# Patient Record
Sex: MALE | Race: BLACK OR AFRICAN AMERICAN | Employment: FULL TIME | URBAN - METROPOLITAN AREA
[De-identification: names, ages, dates, MRNs, and addresses within clinical notes are randomized per-mention and may not be internally consistent; named-entity substitution may affect disease eponyms.]

---

## 2022-10-28 ENCOUNTER — APPOINTMENT (OUTPATIENT)
Dept: GENERAL RADIOLOGY | Age: 52
DRG: 482 | End: 2022-10-28
Attending: EMERGENCY MEDICINE
Payer: COMMERCIAL

## 2022-10-28 ENCOUNTER — HOSPITAL ENCOUNTER (INPATIENT)
Age: 52
LOS: 3 days | Discharge: HOME HEALTH | DRG: 482 | End: 2022-10-31
Attending: EMERGENCY MEDICINE | Admitting: ORTHOPAEDIC SURGERY
Payer: COMMERCIAL

## 2022-10-28 DIAGNOSIS — S72.001A CLOSED FRACTURE OF RIGHT HIP, INITIAL ENCOUNTER (HCC): Primary | ICD-10-CM

## 2022-10-28 PROCEDURE — 86900 BLOOD TYPING SEROLOGIC ABO: CPT

## 2022-10-28 PROCEDURE — 74011250636 HC RX REV CODE- 250/636: Performed by: EMERGENCY MEDICINE

## 2022-10-28 PROCEDURE — 85025 COMPLETE CBC W/AUTO DIFF WBC: CPT

## 2022-10-28 PROCEDURE — 80076 HEPATIC FUNCTION PANEL: CPT

## 2022-10-28 PROCEDURE — 85610 PROTHROMBIN TIME: CPT

## 2022-10-28 PROCEDURE — 85730 THROMBOPLASTIN TIME PARTIAL: CPT

## 2022-10-28 PROCEDURE — 65270000029 HC RM PRIVATE

## 2022-10-28 PROCEDURE — 73552 X-RAY EXAM OF FEMUR 2/>: CPT

## 2022-10-28 PROCEDURE — 99285 EMERGENCY DEPT VISIT HI MDM: CPT

## 2022-10-28 PROCEDURE — 80048 BASIC METABOLIC PNL TOTAL CA: CPT

## 2022-10-28 PROCEDURE — 73502 X-RAY EXAM HIP UNI 2-3 VIEWS: CPT

## 2022-10-28 RX ORDER — MORPHINE SULFATE 4 MG/ML
4 INJECTION INTRAVENOUS ONCE
Status: COMPLETED | OUTPATIENT
Start: 2022-10-28 | End: 2022-10-28

## 2022-10-28 RX ORDER — SODIUM CHLORIDE, SODIUM LACTATE, POTASSIUM CHLORIDE, CALCIUM CHLORIDE 600; 310; 30; 20 MG/100ML; MG/100ML; MG/100ML; MG/100ML
75 INJECTION, SOLUTION INTRAVENOUS CONTINUOUS
Status: DISCONTINUED | OUTPATIENT
Start: 2022-10-28 | End: 2022-10-31 | Stop reason: HOSPADM

## 2022-10-28 RX ORDER — ACETAMINOPHEN 325 MG/1
650 TABLET ORAL EVERY 6 HOURS
Status: DISCONTINUED | OUTPATIENT
Start: 2022-10-29 | End: 2022-10-31

## 2022-10-28 RX ORDER — OXYCODONE HYDROCHLORIDE 5 MG/1
5 TABLET ORAL
Status: DISCONTINUED | OUTPATIENT
Start: 2022-10-28 | End: 2022-10-31

## 2022-10-28 RX ORDER — MORPHINE SULFATE 2 MG/ML
1 INJECTION, SOLUTION INTRAMUSCULAR; INTRAVENOUS
Status: DISCONTINUED | OUTPATIENT
Start: 2022-10-28 | End: 2022-10-29

## 2022-10-28 RX ORDER — ONDANSETRON 2 MG/ML
4 INJECTION INTRAMUSCULAR; INTRAVENOUS
Status: COMPLETED | OUTPATIENT
Start: 2022-10-28 | End: 2022-10-28

## 2022-10-28 RX ORDER — SODIUM CHLORIDE 9 MG/ML
150 INJECTION, SOLUTION INTRAVENOUS ONCE
Status: COMPLETED | OUTPATIENT
Start: 2022-10-28 | End: 2022-10-29

## 2022-10-28 RX ADMIN — ONDANSETRON 4 MG: 2 INJECTION INTRAMUSCULAR; INTRAVENOUS at 22:52

## 2022-10-28 RX ADMIN — SODIUM CHLORIDE 150 ML/HR: 9 INJECTION, SOLUTION INTRAVENOUS at 22:54

## 2022-10-28 RX ADMIN — MORPHINE SULFATE 4 MG: 4 INJECTION INTRAVENOUS at 22:52

## 2022-10-28 NOTE — Clinical Note
Status[de-identified] INPATIENT [101]   Type of Bed: Surgical [18]   Inpatient Hospitalization Certified Necessary for the Following Reasons: 3.  Patient receiving treatment that can only be provided in an inpatient setting (further clarification in H&P documentation)   Admitting Diagnosis: Closed right hip fracture [facility]) [[id number]]   Admitting Physician: Farhana Blood   Attending Physician: Farhana Blood   Estimated Length of Stay: 2 Midnights   Discharge Plan[de-identified] Home with Office Follow-up

## 2022-10-29 ENCOUNTER — ANESTHESIA (OUTPATIENT)
Dept: SURGERY | Age: 52
DRG: 482 | End: 2022-10-29
Payer: COMMERCIAL

## 2022-10-29 ENCOUNTER — ANESTHESIA EVENT (OUTPATIENT)
Dept: SURGERY | Age: 52
DRG: 482 | End: 2022-10-29
Payer: COMMERCIAL

## 2022-10-29 ENCOUNTER — APPOINTMENT (OUTPATIENT)
Dept: GENERAL RADIOLOGY | Age: 52
DRG: 482 | End: 2022-10-29
Attending: ORTHOPAEDIC SURGERY
Payer: COMMERCIAL

## 2022-10-29 LAB
ABO + RH BLD: NORMAL
ALBUMIN SERPL-MCNC: 3.7 G/DL (ref 3.5–5)
ALBUMIN/GLOB SERPL: 1 {RATIO} (ref 1.1–2.2)
ALP SERPL-CCNC: 71 U/L (ref 45–117)
ALT SERPL-CCNC: 20 U/L (ref 12–78)
ANION GAP SERPL CALC-SCNC: 9 MMOL/L (ref 5–15)
APTT PPP: 22.7 SEC (ref 22.1–31)
AST SERPL W P-5'-P-CCNC: 24 U/L (ref 15–37)
BASOPHILS # BLD: 0 K/UL (ref 0–0.1)
BASOPHILS NFR BLD: 0 % (ref 0–1)
BILIRUB DIRECT SERPL-MCNC: 0.2 MG/DL (ref 0–0.2)
BILIRUB SERPL-MCNC: 0.8 MG/DL (ref 0.2–1)
BLOOD GROUP ANTIBODIES SERPL: NEGATIVE
BUN SERPL-MCNC: 17 MG/DL (ref 6–20)
BUN/CREAT SERPL: 15 (ref 12–20)
CA-I BLD-MCNC: 9 MG/DL (ref 8.5–10.1)
CHLORIDE SERPL-SCNC: 101 MMOL/L (ref 97–108)
CO2 SERPL-SCNC: 29 MMOL/L (ref 21–32)
CREAT SERPL-MCNC: 1.17 MG/DL (ref 0.7–1.3)
DIFFERENTIAL METHOD BLD: ABNORMAL
EOSINOPHIL # BLD: 0 K/UL (ref 0–0.4)
EOSINOPHIL NFR BLD: 0 % (ref 0–7)
ERYTHROCYTE [DISTWIDTH] IN BLOOD BY AUTOMATED COUNT: 13.3 % (ref 11.5–14.5)
GLOBULIN SER CALC-MCNC: 3.8 G/DL (ref 2–4)
GLUCOSE SERPL-MCNC: 130 MG/DL (ref 65–100)
HCT VFR BLD AUTO: 47.1 % (ref 36.6–50.3)
HGB BLD-MCNC: 15 G/DL (ref 12.1–17)
IMM GRANULOCYTES # BLD AUTO: 0 K/UL (ref 0–0.04)
IMM GRANULOCYTES NFR BLD AUTO: 0 % (ref 0–0.5)
INR PPP: 1 (ref 0.9–1.1)
LYMPHOCYTES # BLD: 1.2 K/UL (ref 0.8–3.5)
LYMPHOCYTES NFR BLD: 13 % (ref 12–49)
MCH RBC QN AUTO: 28.5 PG (ref 26–34)
MCHC RBC AUTO-ENTMCNC: 31.8 G/DL (ref 30–36.5)
MCV RBC AUTO: 89.4 FL (ref 80–99)
MONOCYTES # BLD: 0.5 K/UL (ref 0–1)
MONOCYTES NFR BLD: 6 % (ref 5–13)
NEUTS SEG # BLD: 7.8 K/UL (ref 1.8–8)
NEUTS SEG NFR BLD: 81 % (ref 32–75)
PLATELET # BLD AUTO: 223 K/UL (ref 150–400)
PMV BLD AUTO: 10.6 FL (ref 8.9–12.9)
POTASSIUM SERPL-SCNC: 3.8 MMOL/L (ref 3.5–5.1)
PROT SERPL-MCNC: 7.5 G/DL (ref 6.4–8.2)
PROTHROMBIN TIME: 9.8 SEC (ref 9–11.1)
RBC # BLD AUTO: 5.27 M/UL (ref 4.1–5.7)
SODIUM SERPL-SCNC: 139 MMOL/L (ref 136–145)
SPECIMEN EXP DATE BLD: NORMAL
THERAPEUTIC RANGE,PTTT: NORMAL SEC (ref 82–109)
WBC # BLD AUTO: 9.6 K/UL (ref 4.1–11.1)

## 2022-10-29 PROCEDURE — 74011250637 HC RX REV CODE- 250/637: Performed by: ORTHOPAEDIC SURGERY

## 2022-10-29 PROCEDURE — 74011250636 HC RX REV CODE- 250/636: Performed by: ORTHOPAEDIC SURGERY

## 2022-10-29 PROCEDURE — 76010000153 HC OR TIME 1.5 TO 2 HR: Performed by: ORTHOPAEDIC SURGERY

## 2022-10-29 PROCEDURE — C1713 ANCHOR/SCREW BN/BN,TIS/BN: HCPCS | Performed by: ORTHOPAEDIC SURGERY

## 2022-10-29 PROCEDURE — 74011000250 HC RX REV CODE- 250: Performed by: ORTHOPAEDIC SURGERY

## 2022-10-29 PROCEDURE — 65270000029 HC RM PRIVATE

## 2022-10-29 PROCEDURE — 0QS636Z REPOSITION RIGHT UPPER FEMUR WITH INTRAMEDULLARY INTERNAL FIXATION DEVICE, PERCUTANEOUS APPROACH: ICD-10-PCS | Performed by: ORTHOPAEDIC SURGERY

## 2022-10-29 PROCEDURE — 74011250636 HC RX REV CODE- 250/636: Performed by: NURSE ANESTHETIST, CERTIFIED REGISTERED

## 2022-10-29 PROCEDURE — 77030002933 HC SUT MCRYL J&J -A: Performed by: ORTHOPAEDIC SURGERY

## 2022-10-29 PROCEDURE — 77030041614 HC WRP CLD THER BREG -B: Performed by: ORTHOPAEDIC SURGERY

## 2022-10-29 PROCEDURE — 2709999900 HC NON-CHARGEABLE SUPPLY: Performed by: ORTHOPAEDIC SURGERY

## 2022-10-29 PROCEDURE — 76210000016 HC OR PH I REC 1 TO 1.5 HR: Performed by: ORTHOPAEDIC SURGERY

## 2022-10-29 PROCEDURE — 74011250636 HC RX REV CODE- 250/636: Performed by: EMERGENCY MEDICINE

## 2022-10-29 PROCEDURE — 74011000250 HC RX REV CODE- 250: Performed by: NURSE ANESTHETIST, CERTIFIED REGISTERED

## 2022-10-29 PROCEDURE — 74011000258 HC RX REV CODE- 258: Performed by: NURSE ANESTHETIST, CERTIFIED REGISTERED

## 2022-10-29 PROCEDURE — C1769 GUIDE WIRE: HCPCS | Performed by: ORTHOPAEDIC SURGERY

## 2022-10-29 PROCEDURE — 77030016474 HC BIT DRL QC3 SYNT -C: Performed by: ORTHOPAEDIC SURGERY

## 2022-10-29 PROCEDURE — 77030018673: Performed by: ORTHOPAEDIC SURGERY

## 2022-10-29 PROCEDURE — 77030040361 HC SLV COMPR DVT MDII -B: Performed by: ORTHOPAEDIC SURGERY

## 2022-10-29 PROCEDURE — 74011250636 HC RX REV CODE- 250/636: Performed by: ANESTHESIOLOGY

## 2022-10-29 PROCEDURE — 76060000034 HC ANESTHESIA 1.5 TO 2 HR: Performed by: ORTHOPAEDIC SURGERY

## 2022-10-29 PROCEDURE — 76000 FLUOROSCOPY <1 HR PHYS/QHP: CPT

## 2022-10-29 PROCEDURE — 77030031139 HC SUT VCRL2 J&J -A: Performed by: ORTHOPAEDIC SURGERY

## 2022-10-29 DEVICE — NAIL IM L235MM DIA11MM 130DEG SHT GRN R PROX FEM TI: Type: IMPLANTABLE DEVICE | Site: HIP | Status: FUNCTIONAL

## 2022-10-29 DEVICE — IMPLANTABLE DEVICE: Type: IMPLANTABLE DEVICE | Site: HIP | Status: FUNCTIONAL

## 2022-10-29 DEVICE — SCREW BNE L44MM DIA5MM COR DIA4.3MM TIB LT GRN TI ALLY ST: Type: IMPLANTABLE DEVICE | Site: HIP | Status: FUNCTIONAL

## 2022-10-29 RX ORDER — HYDRALAZINE HYDROCHLORIDE 20 MG/ML
10 INJECTION INTRAMUSCULAR; INTRAVENOUS
Status: DISCONTINUED | OUTPATIENT
Start: 2022-10-29 | End: 2022-10-29 | Stop reason: HOSPADM

## 2022-10-29 RX ORDER — SODIUM CHLORIDE 0.9 % (FLUSH) 0.9 %
5-40 SYRINGE (ML) INJECTION AS NEEDED
Status: DISCONTINUED | OUTPATIENT
Start: 2022-10-29 | End: 2022-10-29 | Stop reason: HOSPADM

## 2022-10-29 RX ORDER — ONDANSETRON 2 MG/ML
4 INJECTION INTRAMUSCULAR; INTRAVENOUS AS NEEDED
Status: DISCONTINUED | OUTPATIENT
Start: 2022-10-29 | End: 2022-10-29 | Stop reason: HOSPADM

## 2022-10-29 RX ORDER — FENTANYL CITRATE 50 UG/ML
INJECTION, SOLUTION INTRAMUSCULAR; INTRAVENOUS AS NEEDED
Status: DISCONTINUED | OUTPATIENT
Start: 2022-10-29 | End: 2022-10-29 | Stop reason: HOSPADM

## 2022-10-29 RX ORDER — HYDROMORPHONE HYDROCHLORIDE 1 MG/ML
1 INJECTION, SOLUTION INTRAMUSCULAR; INTRAVENOUS; SUBCUTANEOUS ONCE
Status: COMPLETED | OUTPATIENT
Start: 2022-10-29 | End: 2022-10-29

## 2022-10-29 RX ORDER — DIPHENHYDRAMINE HCL 12.5MG/5ML
12.5 ELIXIR ORAL
Status: DISPENSED | OUTPATIENT
Start: 2022-10-29 | End: 2022-10-30

## 2022-10-29 RX ORDER — LABETALOL HCL 20 MG/4 ML
5 SYRINGE (ML) INTRAVENOUS
Status: DISCONTINUED | OUTPATIENT
Start: 2022-10-29 | End: 2022-10-29 | Stop reason: HOSPADM

## 2022-10-29 RX ORDER — MORPHINE SULFATE 10 MG/ML
2 INJECTION, SOLUTION INTRAMUSCULAR; INTRAVENOUS
Status: DISCONTINUED | OUTPATIENT
Start: 2022-10-29 | End: 2022-10-29 | Stop reason: HOSPADM

## 2022-10-29 RX ORDER — METOPROLOL TARTRATE 5 MG/5ML
2.5 INJECTION INTRAVENOUS
Status: DISCONTINUED | OUTPATIENT
Start: 2022-10-29 | End: 2022-10-29 | Stop reason: HOSPADM

## 2022-10-29 RX ORDER — NORETHINDRONE AND ETHINYL ESTRADIOL 0.5-0.035
5 KIT ORAL AS NEEDED
Status: DISCONTINUED | OUTPATIENT
Start: 2022-10-29 | End: 2022-10-29 | Stop reason: HOSPADM

## 2022-10-29 RX ORDER — MORPHINE SULFATE 15 MG/1
15 TABLET ORAL
Status: DISCONTINUED | OUTPATIENT
Start: 2022-10-29 | End: 2022-10-29 | Stop reason: HOSPADM

## 2022-10-29 RX ORDER — SODIUM CHLORIDE 0.9 % (FLUSH) 0.9 %
5-40 SYRINGE (ML) INJECTION EVERY 8 HOURS
Status: DISCONTINUED | OUTPATIENT
Start: 2022-10-29 | End: 2022-10-29 | Stop reason: HOSPADM

## 2022-10-29 RX ORDER — ASPIRIN 81 MG/1
81 TABLET ORAL 2 TIMES DAILY
Status: DISCONTINUED | OUTPATIENT
Start: 2022-10-29 | End: 2022-10-31 | Stop reason: HOSPADM

## 2022-10-29 RX ORDER — SODIUM CHLORIDE, SODIUM LACTATE, POTASSIUM CHLORIDE, CALCIUM CHLORIDE 600; 310; 30; 20 MG/100ML; MG/100ML; MG/100ML; MG/100ML
INJECTION, SOLUTION INTRAVENOUS
Status: DISCONTINUED | OUTPATIENT
Start: 2022-10-29 | End: 2022-10-29 | Stop reason: HOSPADM

## 2022-10-29 RX ORDER — MORPHINE SULFATE 2 MG/ML
1 INJECTION, SOLUTION INTRAMUSCULAR; INTRAVENOUS
Status: DISCONTINUED | OUTPATIENT
Start: 2022-10-29 | End: 2022-10-29

## 2022-10-29 RX ORDER — NORETHINDRONE AND ETHINYL ESTRADIOL 0.5-0.035
KIT ORAL AS NEEDED
Status: DISCONTINUED | OUTPATIENT
Start: 2022-10-29 | End: 2022-10-29 | Stop reason: HOSPADM

## 2022-10-29 RX ORDER — MIDAZOLAM HYDROCHLORIDE 1 MG/ML
0.5 INJECTION, SOLUTION INTRAMUSCULAR; INTRAVENOUS
Status: DISCONTINUED | OUTPATIENT
Start: 2022-10-29 | End: 2022-10-29 | Stop reason: HOSPADM

## 2022-10-29 RX ORDER — MIDAZOLAM HYDROCHLORIDE 1 MG/ML
1 INJECTION, SOLUTION INTRAMUSCULAR; INTRAVENOUS AS NEEDED
Status: DISCONTINUED | OUTPATIENT
Start: 2022-10-29 | End: 2022-10-29 | Stop reason: HOSPADM

## 2022-10-29 RX ORDER — SODIUM CHLORIDE 9 MG/ML
125 INJECTION, SOLUTION INTRAVENOUS CONTINUOUS
Status: DISPENSED | OUTPATIENT
Start: 2022-10-29 | End: 2022-10-30

## 2022-10-29 RX ORDER — OXYCODONE AND ACETAMINOPHEN 5; 325 MG/1; MG/1
1 TABLET ORAL AS NEEDED
Status: DISCONTINUED | OUTPATIENT
Start: 2022-10-29 | End: 2022-10-29 | Stop reason: HOSPADM

## 2022-10-29 RX ORDER — SUCCINYLCHOLINE CHLORIDE 20 MG/ML
INJECTION INTRAMUSCULAR; INTRAVENOUS AS NEEDED
Status: DISCONTINUED | OUTPATIENT
Start: 2022-10-29 | End: 2022-10-29 | Stop reason: HOSPADM

## 2022-10-29 RX ORDER — HYDROMORPHONE HYDROCHLORIDE 2 MG/1
1 TABLET ORAL
Status: DISCONTINUED | OUTPATIENT
Start: 2022-10-29 | End: 2022-10-29 | Stop reason: HOSPADM

## 2022-10-29 RX ORDER — OXYCODONE HYDROCHLORIDE 5 MG/1
5 TABLET ORAL
Status: DISCONTINUED | OUTPATIENT
Start: 2022-10-29 | End: 2022-10-29 | Stop reason: HOSPADM

## 2022-10-29 RX ORDER — NALOXONE HYDROCHLORIDE 0.4 MG/ML
0.4 INJECTION, SOLUTION INTRAMUSCULAR; INTRAVENOUS; SUBCUTANEOUS AS NEEDED
Status: DISCONTINUED | OUTPATIENT
Start: 2022-10-29 | End: 2022-10-31 | Stop reason: HOSPADM

## 2022-10-29 RX ORDER — FACIAL-BODY WIPES
10 EACH TOPICAL DAILY PRN
Status: DISCONTINUED | OUTPATIENT
Start: 2022-10-31 | End: 2022-10-31 | Stop reason: HOSPADM

## 2022-10-29 RX ORDER — OXYCODONE AND ACETAMINOPHEN 5; 325 MG/1; MG/1
1 TABLET ORAL
Status: DISCONTINUED | OUTPATIENT
Start: 2022-10-29 | End: 2022-10-29 | Stop reason: HOSPADM

## 2022-10-29 RX ORDER — ONDANSETRON 2 MG/ML
INJECTION INTRAMUSCULAR; INTRAVENOUS AS NEEDED
Status: DISCONTINUED | OUTPATIENT
Start: 2022-10-29 | End: 2022-10-29 | Stop reason: HOSPADM

## 2022-10-29 RX ORDER — FENTANYL CITRATE 50 UG/ML
50 INJECTION, SOLUTION INTRAMUSCULAR; INTRAVENOUS AS NEEDED
Status: DISCONTINUED | OUTPATIENT
Start: 2022-10-29 | End: 2022-10-29 | Stop reason: HOSPADM

## 2022-10-29 RX ORDER — LIDOCAINE HYDROCHLORIDE 20 MG/ML
INJECTION, SOLUTION EPIDURAL; INFILTRATION; INTRACAUDAL; PERINEURAL AS NEEDED
Status: DISCONTINUED | OUTPATIENT
Start: 2022-10-29 | End: 2022-10-29 | Stop reason: HOSPADM

## 2022-10-29 RX ORDER — DEXAMETHASONE SODIUM PHOSPHATE 4 MG/ML
INJECTION, SOLUTION INTRA-ARTICULAR; INTRALESIONAL; INTRAMUSCULAR; INTRAVENOUS; SOFT TISSUE AS NEEDED
Status: DISCONTINUED | OUTPATIENT
Start: 2022-10-29 | End: 2022-10-29 | Stop reason: HOSPADM

## 2022-10-29 RX ORDER — FENTANYL CITRATE 50 UG/ML
50 INJECTION, SOLUTION INTRAMUSCULAR; INTRAVENOUS
Status: DISCONTINUED | OUTPATIENT
Start: 2022-10-29 | End: 2022-10-29 | Stop reason: HOSPADM

## 2022-10-29 RX ORDER — LIDOCAINE HYDROCHLORIDE 10 MG/ML
0.1 INJECTION, SOLUTION EPIDURAL; INFILTRATION; INTRACAUDAL; PERINEURAL AS NEEDED
Status: DISCONTINUED | OUTPATIENT
Start: 2022-10-29 | End: 2022-10-29 | Stop reason: HOSPADM

## 2022-10-29 RX ORDER — HYDROMORPHONE HYDROCHLORIDE 1 MG/ML
INJECTION, SOLUTION INTRAMUSCULAR; INTRAVENOUS; SUBCUTANEOUS AS NEEDED
Status: DISCONTINUED | OUTPATIENT
Start: 2022-10-29 | End: 2022-10-29 | Stop reason: HOSPADM

## 2022-10-29 RX ORDER — AMOXICILLIN 250 MG
1 CAPSULE ORAL 2 TIMES DAILY
Status: DISCONTINUED | OUTPATIENT
Start: 2022-10-29 | End: 2022-10-31 | Stop reason: HOSPADM

## 2022-10-29 RX ORDER — PROPOFOL 10 MG/ML
INJECTION, EMULSION INTRAVENOUS AS NEEDED
Status: DISCONTINUED | OUTPATIENT
Start: 2022-10-29 | End: 2022-10-29 | Stop reason: HOSPADM

## 2022-10-29 RX ORDER — ROCURONIUM BROMIDE 10 MG/ML
INJECTION, SOLUTION INTRAVENOUS AS NEEDED
Status: DISCONTINUED | OUTPATIENT
Start: 2022-10-29 | End: 2022-10-29 | Stop reason: HOSPADM

## 2022-10-29 RX ORDER — MORPHINE SULFATE 4 MG/ML
4 INJECTION INTRAVENOUS
Status: DISPENSED | OUTPATIENT
Start: 2022-10-29 | End: 2022-10-31

## 2022-10-29 RX ORDER — OXYCODONE HYDROCHLORIDE 5 MG/1
10 TABLET ORAL
Status: DISCONTINUED | OUTPATIENT
Start: 2022-10-29 | End: 2022-10-31

## 2022-10-29 RX ORDER — SODIUM CHLORIDE 0.9 % (FLUSH) 0.9 %
5-40 SYRINGE (ML) INJECTION AS NEEDED
Status: DISCONTINUED | OUTPATIENT
Start: 2022-10-29 | End: 2022-10-31 | Stop reason: HOSPADM

## 2022-10-29 RX ORDER — HYDROCODONE BITARTRATE AND ACETAMINOPHEN 5; 325 MG/1; MG/1
1 TABLET ORAL
Status: DISCONTINUED | OUTPATIENT
Start: 2022-10-29 | End: 2022-10-29 | Stop reason: HOSPADM

## 2022-10-29 RX ORDER — DIPHENHYDRAMINE HYDROCHLORIDE 50 MG/ML
12.5 INJECTION, SOLUTION INTRAMUSCULAR; INTRAVENOUS AS NEEDED
Status: COMPLETED | OUTPATIENT
Start: 2022-10-29 | End: 2022-10-29

## 2022-10-29 RX ORDER — HYDROMORPHONE HYDROCHLORIDE 1 MG/ML
0.5 INJECTION, SOLUTION INTRAMUSCULAR; INTRAVENOUS; SUBCUTANEOUS
Status: DISCONTINUED | OUTPATIENT
Start: 2022-10-29 | End: 2022-10-29 | Stop reason: HOSPADM

## 2022-10-29 RX ORDER — SODIUM CHLORIDE 0.9 % (FLUSH) 0.9 %
5-40 SYRINGE (ML) INJECTION EVERY 8 HOURS
Status: DISCONTINUED | OUTPATIENT
Start: 2022-10-29 | End: 2022-10-31 | Stop reason: HOSPADM

## 2022-10-29 RX ORDER — POLYETHYLENE GLYCOL 3350 17 G/17G
17 POWDER, FOR SOLUTION ORAL DAILY
Status: DISCONTINUED | OUTPATIENT
Start: 2022-10-30 | End: 2022-10-31 | Stop reason: HOSPADM

## 2022-10-29 RX ADMIN — SODIUM CHLORIDE 125 ML/HR: 9 INJECTION, SOLUTION INTRAVENOUS at 17:57

## 2022-10-29 RX ADMIN — MORPHINE SULFATE 4 MG: 4 INJECTION, SOLUTION INTRAMUSCULAR; INTRAVENOUS at 09:00

## 2022-10-29 RX ADMIN — PHENYLEPHRINE HYDROCHLORIDE 200 MCG: 10 INJECTION INTRAVENOUS at 16:30

## 2022-10-29 RX ADMIN — HYDROMORPHONE HYDROCHLORIDE 0.5 MG: 1 INJECTION, SOLUTION INTRAMUSCULAR; INTRAVENOUS; SUBCUTANEOUS at 18:02

## 2022-10-29 RX ADMIN — ASPIRIN 81 MG: 81 TABLET, COATED ORAL at 21:20

## 2022-10-29 RX ADMIN — OXYCODONE 10 MG: 5 TABLET ORAL at 19:01

## 2022-10-29 RX ADMIN — EPHEDRINE SULFATE 20 MG: 50 INJECTION INTRAVENOUS at 16:26

## 2022-10-29 RX ADMIN — ONDANSETRON 4 MG: 2 INJECTION INTRAMUSCULAR; INTRAVENOUS at 16:19

## 2022-10-29 RX ADMIN — DIPHENHYDRAMINE HYDROCHLORIDE 12.5 MG: 50 INJECTION INTRAMUSCULAR; INTRAVENOUS at 18:23

## 2022-10-29 RX ADMIN — ACETAMINOPHEN 650 MG: 325 TABLET, FILM COATED ORAL at 19:00

## 2022-10-29 RX ADMIN — EPHEDRINE SULFATE 20 MG: 50 INJECTION INTRAVENOUS at 16:22

## 2022-10-29 RX ADMIN — FENTANYL CITRATE 100 MCG: 50 INJECTION, SOLUTION INTRAMUSCULAR; INTRAVENOUS at 16:01

## 2022-10-29 RX ADMIN — SODIUM CHLORIDE, POTASSIUM CHLORIDE, SODIUM LACTATE AND CALCIUM CHLORIDE 75 ML/HR: 600; 310; 30; 20 INJECTION, SOLUTION INTRAVENOUS at 05:39

## 2022-10-29 RX ADMIN — FENTANYL CITRATE 50 MCG: 0.05 INJECTION, SOLUTION INTRAMUSCULAR; INTRAVENOUS at 18:18

## 2022-10-29 RX ADMIN — HYDROMORPHONE HYDROCHLORIDE 1 MG: 1 INJECTION, SOLUTION INTRAMUSCULAR; INTRAVENOUS; SUBCUTANEOUS at 16:01

## 2022-10-29 RX ADMIN — HYDROMORPHONE HYDROCHLORIDE 0.5 MG: 1 INJECTION, SOLUTION INTRAMUSCULAR; INTRAVENOUS; SUBCUTANEOUS at 17:49

## 2022-10-29 RX ADMIN — SUCCINYLCHOLINE CHLORIDE 140 MG: 20 INJECTION, SOLUTION INTRAMUSCULAR; INTRAVENOUS at 16:01

## 2022-10-29 RX ADMIN — PHENYLEPHRINE HYDROCHLORIDE 100 MCG: 10 INJECTION INTRAVENOUS at 17:15

## 2022-10-29 RX ADMIN — MORPHINE SULFATE 1 MG: 2 INJECTION, SOLUTION INTRAMUSCULAR; INTRAVENOUS at 05:45

## 2022-10-29 RX ADMIN — EPHEDRINE SULFATE 10 MG: 50 INJECTION INTRAVENOUS at 16:24

## 2022-10-29 RX ADMIN — SUGAMMADEX 400 MG: 100 INJECTION, SOLUTION INTRAVENOUS at 17:19

## 2022-10-29 RX ADMIN — HYDRALAZINE HYDROCHLORIDE 10 MG: 20 INJECTION, SOLUTION INTRAMUSCULAR; INTRAVENOUS at 18:32

## 2022-10-29 RX ADMIN — SODIUM CHLORIDE, POTASSIUM CHLORIDE, SODIUM LACTATE AND CALCIUM CHLORIDE: 600; 310; 30; 20 INJECTION, SOLUTION INTRAVENOUS at 15:53

## 2022-10-29 RX ADMIN — MORPHINE SULFATE 1 MG: 2 INJECTION, SOLUTION INTRAMUSCULAR; INTRAVENOUS at 03:19

## 2022-10-29 RX ADMIN — ACETAMINOPHEN 650 MG: 325 TABLET, FILM COATED ORAL at 05:48

## 2022-10-29 RX ADMIN — DIPHENHYDRAMINE HYDROCHLORIDE 12.5 MG: 50 INJECTION INTRAMUSCULAR; INTRAVENOUS at 18:36

## 2022-10-29 RX ADMIN — SENNOSIDES AND DOCUSATE SODIUM 1 TABLET: 50; 8.6 TABLET ORAL at 21:20

## 2022-10-29 RX ADMIN — HYDRALAZINE HYDROCHLORIDE 10 MG: 20 INJECTION, SOLUTION INTRAMUSCULAR; INTRAVENOUS at 17:58

## 2022-10-29 RX ADMIN — ACETAMINOPHEN 650 MG: 325 TABLET, FILM COATED ORAL at 12:08

## 2022-10-29 RX ADMIN — SODIUM CHLORIDE, PRESERVATIVE FREE 10 ML: 5 INJECTION INTRAVENOUS at 21:33

## 2022-10-29 RX ADMIN — LIDOCAINE HYDROCHLORIDE 100 MG: 20 INJECTION, SOLUTION EPIDURAL; INFILTRATION; INTRACAUDAL; PERINEURAL at 16:01

## 2022-10-29 RX ADMIN — HYDROMORPHONE HYDROCHLORIDE 1 MG: 1 INJECTION, SOLUTION INTRAMUSCULAR; INTRAVENOUS; SUBCUTANEOUS at 00:31

## 2022-10-29 RX ADMIN — PROPOFOL 200 MG: 10 INJECTION, EMULSION INTRAVENOUS at 16:01

## 2022-10-29 RX ADMIN — OXYCODONE 10 MG: 5 TABLET ORAL at 09:28

## 2022-10-29 RX ADMIN — MORPHINE SULFATE 4 MG: 4 INJECTION, SOLUTION INTRAMUSCULAR; INTRAVENOUS at 21:20

## 2022-10-29 RX ADMIN — DEXAMETHASONE SODIUM PHOSPHATE 4 MG: 4 INJECTION, SOLUTION INTRA-ARTICULAR; INTRALESIONAL; INTRAMUSCULAR; INTRAVENOUS; SOFT TISSUE at 16:19

## 2022-10-29 RX ADMIN — ACETAMINOPHEN 650 MG: 325 TABLET, FILM COATED ORAL at 00:31

## 2022-10-29 RX ADMIN — ROCURONIUM BROMIDE 30 MG: 10 INJECTION, SOLUTION INTRAVENOUS at 16:07

## 2022-10-29 RX ADMIN — CEFAZOLIN 3 G: 10 INJECTION, POWDER, FOR SOLUTION INTRAVENOUS at 16:18

## 2022-10-29 NOTE — PROGRESS NOTES
Family updated at 6:00 PM by Josafat Galan RN. Dr. Jennifer Camargo contacted Halina Orts patient's significant other about patient's progress and plan of care.

## 2022-10-29 NOTE — OP NOTES
Name: Nickolas Wolfe    MRN: [id number]    Surgery Date: 10/29/2022    Primary Surgeon: Yaya Carr MD    Pre-operative diagnosis: Intertrochanteric fracture of the right hip    Post-operative diagnosis: intertrochanteric fracture of right hip    Surgery Performed:  Closed reduction and intramedullary nailing of right hip intertrochanteric fracture    Assistants:  none    Anesthesia:  General    Estimated Blood Loss:  093 cc    Complications: none    Specimen:  none    Implants:   : Synthes   11 mm x intermidiate X 130 degree TFNA   263 mm long helical blade   42 mm long distal locking screw    Indications for surgery:    patient is 46y.o.-year-old male who fell down  and injured the hip. patient was brought to ER where x-rays demonstrated intertrochanteric fracture. treatment options were discussed and then after discussing risks and benefits, we decided to take her in operating room for fixation of intertrochanteric fracture. Description of Procedure:   Patient as well as  left extremity was identified and marked in holding area and then patient was brought to the room. general anesthesia was induced. patient was positioned on fracture table with the  fractured lower extremity in traction and opposite leg in well-padded well leg benjamin. reduction was achieved with closed manipulation and traction under fluoroscopic guidance. Standard preping and draping was done. 3 grams of Ancef antibiotics was infused within 30 min of incision. time out was completed. Surface line marking was done and proximal aspect of greater trochanter was marked. now approximately 1 inch incision was made proximally and dissection was carried through soft tissue and gluteus muscles. tip of the greater trochanter was palpated and then sharp awl was inserted through the trochanteric entry. fluoroscopic views were done to confirm position and then guide wire was inserted.  now proximal drilling was done using appropriate drill. above-mentioned size short TFNA nail was inserted. now using the aiming guide, cephalomedullary guide pin was inserted under fluoroscopic views. measurement was done and then above-mentioned size helical blade was impacted. position of the helical blade was checked under fluoroscopic views. now approximately the blade was secured by tightening the cap screw and then reverse screwing was done to achieve compression at fracture site for couple turns. now distal locking screw was applied via aiming guide. whole construct was checked under fluoroscopic views. irrigation was done after removing aiming guide. closure was done with 2 0 Vicryl for subcutaneous tissues and staples for the skin. Steri-Strips and op-site dressing was applied. Pt's Condition at end of Procedure/Post-Sedation: Patient was transferred to recovery room in stable condition. recovery room neurovascular status was intact. Attending: Leena Florian MD was present during entire procedure and performed all steps by myself.

## 2022-10-29 NOTE — ED PROVIDER NOTES
EMERGENCY DEPARTMENT HISTORY AND PHYSICAL EXAM      Date: 10/28/2022  Patient Name: Lucía Ashraf    History of Presenting Illness       History Provided By: Patient    HPI: Lucía Ashraf, 46 y.o. male presents to the ED with cc of right hip pain. Patient complains of right hip pain after a ground-level fall that occurred about 1 hour prior to arrival.  Patient states the pain has been constant and severe degree with any movement or ambulation aggravating the pain. No head injury. No other injury. No chest pain shortness of breath or abdominal pain. No neck or back pain. No other extremity pain. No paresthesia or motor dysfunction. Patient denies any history of hip pathology. Past History     Past Medical History:  History reviewed. No pertinent past medical history. Past Surgical History:  History reviewed. No pertinent surgical history. Family History:  History reviewed. No pertinent family history. Social History:  Social History     Tobacco Use    Smoking status: Every Day     Packs/day: 0.25     Types: Cigarettes     Passive exposure: Never    Smokeless tobacco: Never   Substance Use Topics    Alcohol use: Yes    Drug use: Never       Allergies:  No Known Allergies      Review of Systems   Review of Systems   Constitutional:  Negative for chills and fever. HENT:  Negative for sore throat. Eyes:  Negative for discharge. Respiratory:  Negative for cough. Cardiovascular:  Negative for chest pain. Gastrointestinal:  Negative for abdominal pain. Genitourinary:  Negative for difficulty urinating. Musculoskeletal:  Positive for arthralgias. Negative for back pain. Skin:  Negative for rash. Neurological:  Negative for headaches. All other systems reviewed and are negative. Physical Exam   Physical Exam  Vitals and nursing note reviewed. Constitutional:       General: He is not in acute distress. Appearance: Normal appearance.  He is not ill-appearing, toxic-appearing or diaphoretic. HENT:      Head: Normocephalic and atraumatic. Right Ear: External ear normal.      Left Ear: External ear normal.      Nose: Nose normal.      Mouth/Throat:      Mouth: Mucous membranes are moist.   Eyes:      Extraocular Movements: Extraocular movements intact. Conjunctiva/sclera: Conjunctivae normal.      Pupils: Pupils are equal, round, and reactive to light. Cardiovascular:      Rate and Rhythm: Normal rate and regular rhythm. Heart sounds: Normal heart sounds. Pulmonary:      Effort: Pulmonary effort is normal.      Breath sounds: Normal breath sounds. Chest:      Chest wall: No deformity or tenderness. Abdominal:      General: Abdomen is flat. Bowel sounds are normal.      Palpations: Abdomen is soft. Tenderness: There is no abdominal tenderness. There is no guarding or rebound. Musculoskeletal:      Cervical back: Normal range of motion and neck supple. No tenderness. No muscular tenderness. Comments: Right hip there is externally rotated mildly shortened. Right hip is tender to palpation. .  Pedal pulses palpable. No signs of foot drop. Skin:     General: Skin is warm and dry. Findings: No bruising. Neurological:      General: No focal deficit present. Mental Status: He is alert and oriented to person, place, and time. Psychiatric:         Behavior: Behavior normal.         Thought Content: Thought content normal.       Diagnostic Study Results     Labs -   No results found for this or any previous visit (from the past 12 hour(s)). Radiologic Studies -   [unfilled]  CT Results  (Last 48 hours)      None          CXR Results  (Last 48 hours)      None            Medical Decision Making and ED Course   I am the first provider for this patient. I reviewed the vital signs, available nursing notes, past medical history, past surgical history, family history and social history.     Patient presented with a ground-level fall and a right hip pain. X-ray is consistent with a intratrochanteric fracture. No other injury. Discussed with orthopedic on-call Dr. Sridhar Navarro who will admit for surgical procedure tomorrow. Patient was given a dose of morphine for adequate pain control. Vital Signs-Reviewed the patient's vital signs. Records Reviewed: Nursing note reviewed      ED Course:   Initial assessment performed. The patients presenting problems have been discussed, and they are in agreement with the care plan formulated and outlined with them. I have encouraged them to ask questions as they arise throughout their visit. Feeling better. Pain is minimal.      Procedures       Valerie Owen MD                Disposition     Admitted      DISCHARGE PLAN:  1. There are no discharge medications for this patient. 2.   Follow-up Information    None       3. Return to ED if worse     Diagnosis     Clinical Impression:   1. Closed fracture of right hip, initial encounter [facility])        Attestations:    Valerie Owen MD    Please note that this dictation was completed with [id number], the computer voice recognition software. Quite often unanticipated grammatical, syntax, homophones, and other interpretive errors are inadvertently transcribed by the computer software. Please disregard these errors. Please excuse any errors that have escaped final proofreading. Thank you.

## 2022-10-29 NOTE — ANESTHESIA PREPROCEDURE EVALUATION
Relevant Problems   No relevant active problems       Anesthetic History   No history of anesthetic complications            Review of Systems / Medical History  Patient summary reviewed, nursing notes reviewed and pertinent labs reviewed    Pulmonary          Smoker         Neuro/Psych   Within defined limits           Cardiovascular  Within defined limits                     GI/Hepatic/Renal  Within defined limits              Endo/Other        Obesity     Other Findings            History reviewed. No pertinent past medical history. History reviewed. No pertinent surgical history.     No current outpatient medications    Current Facility-Administered Medications   Medication Dose Route Frequency    morphine injection 4 mg  4 mg IntraVENous Q4H PRN    oxyCODONE IR (ROXICODONE) tablet 10 mg  10 mg Oral Q4H PRN    oxyCODONE IR (ROXICODONE) tablet 5 mg  5 mg Oral Q4H PRN    acetaminophen (TYLENOL) tablet 650 mg  650 mg Oral Q6H    lactated Ringers infusion  75 mL/hr IntraVENous CONTINUOUS       Patient Vitals for the past 24 hrs:   Temp Pulse Resp BP SpO2   10/29/22 0717 -- -- -- (!) 196/105 --   10/29/22 0712 36.9 °C (98.4 °F) 71 18 (!) 214/105 97 %   10/29/22 0401 36.7 °C (98.1 °F) 85 18 (!) 186/105 97 %   10/29/22 0206 -- 66 18 129/88 97 %   10/28/22 2129 36.6 °C (97.8 °F) 68 18 121/80 99 %       Lab Results   Component Value Date/Time    WBC 9.6 10/28/2022 10:45 PM    HGB 15.0 10/28/2022 10:45 PM    HCT 47.1 10/28/2022 10:45 PM    PLATELET 707 22/62/1743 10:45 PM    MCV 89.4 10/28/2022 10:45 PM     Lab Results   Component Value Date/Time    Sodium 139 10/28/2022 10:45 PM    Potassium 3.8 10/28/2022 10:45 PM    Chloride 101 10/28/2022 10:45 PM    CO2 29 10/28/2022 10:45 PM    Anion gap 9 10/28/2022 10:45 PM    Glucose 130 (H) 10/28/2022 10:45 PM    BUN 17 10/28/2022 10:45 PM    Creatinine 1.17 10/28/2022 10:45 PM    BUN/Creatinine ratio 15 10/28/2022 10:45 PM    Calcium 9.0 10/28/2022 10:45 PM     Lab Results   Component Value Date/Time    APTT 22.7 10/28/2022 10:45 PM    PTP 9.8 10/28/2022 10:45 PM    INR 1.0 10/28/2022 10:45 PM     Lab Results   Component Value Date/Time    Glucose 130 (H) 10/28/2022 10:45 PM          Anesthetic Plan    ASA: 2, emergent  Anesthesia type: general          Induction: Intravenous  Anesthetic plan and risks discussed with: Patient and Family

## 2022-10-29 NOTE — H&P
Orthopedic history and physical    Subjective: Mariana Zafar is a 46 y.o. -American male fell down from the steps and had injury to the right hip. Emergency room x-rays demonstrated displaced intertrochanteric fracture of the right hip so I was consulted for the same. There is no history of injury anywhere else  Diffuse pain around the hip  Dull aching and spasm kind  8 out of 10  Any movement of lower extremities extremely painful    Past medical history: Obesity    Surgical history: Not significant    Family history: hypertension in both parents    Social History     Tobacco Use    Smoking status: Every Day     Packs/day: 0.25     Types: Cigarettes     Passive exposure: Never    Smokeless tobacco: Never   Substance Use Topics    Alcohol use: Yes       Current Facility-Administered Medications   Medication Dose Route Frequency Provider Last Rate Last Admin    morphine injection 1 mg  1 mg IntraVENous Q3H PRN Adonis Echeverria MD        oxyCODONE IR (ROXICODONE) tablet 5 mg  5 mg Oral Q4H PRN Adonis Echeverria MD        Allen Dominique ON 10/29/2022] acetaminophen (TYLENOL) tablet 650 mg  650 mg Oral Q6H Parminder Cerrato MD        lactated Ringers infusion  75 mL/hr IntraVENous CONTINUOUS Ever POWERS MD            No Known Allergies     Review of Systems:   Alert and oriented x3    Objective:       Physical Exam:   Examination of right hip:  -Diffuse tenderness around the hip  -Right lower extremity is shortened and externally rotated  -Any movement of right lower extremity is extremely painful  -Sensation intact  -Toes movement full  -Dorsalis pedis and posterior tibial +2    Data Review:   Recent Results (from the past 24 hour(s))   CBC WITH AUTOMATED DIFF    Collection Time: 10/28/22 10:45 PM   Result Value Ref Range    WBC 9.6 4.1 - 11.1 K/uL    RBC 5.27 4. 10 - 5.70 M/uL    HGB 15.0 12.1 - 17.0 g/dL    HCT 47.1 36.6 - 50.3 %    MCV 89.4 80.0 - 99.0 FL    MCH 28.5 26.0 - 34.0 PG    MCHC 31.8 30.0 - 36.5 g/dL    RDW 13.3 11.5 - 14.5 %    PLATELET 302 519 - 666 K/uL    MPV 10.6 8.9 - 12.9 FL    NEUTROPHILS 81 (H) 32 - 75 %    LYMPHOCYTES 13 12 - 49 %    MONOCYTES 6 5 - 13 %    EOSINOPHILS 0 0 - 7 %    BASOPHILS 0 0 - 1 %    IMMATURE GRANULOCYTES 0 0.0 - 0.5 %    ABS. NEUTROPHILS 7.8 1.8 - 8.0 K/UL    ABS. LYMPHOCYTES 1.2 0.8 - 3.5 K/UL    ABS. MONOCYTES 0.5 0.0 - 1.0 K/UL    ABS. EOSINOPHILS 0.0 0.0 - 0.4 K/UL    ABS. BASOPHILS 0.0 0.0 - 0.1 K/UL    ABS. IMM. GRANS. 0.0 0.00 - 0.04 K/UL    DF AUTOMATED     METABOLIC PANEL, BASIC    Collection Time: 10/28/22 10:45 PM   Result Value Ref Range    Sodium 139 136 - 145 mmol/L    Potassium 3.8 3.5 - 5.1 mmol/L    Chloride 101 97 - 108 mmol/L    CO2 29 21 - 32 mmol/L    Anion gap 9 5 - 15 mmol/L    Glucose 130 (H) 65 - 100 mg/dL    BUN 17 6 - 20 mg/dL    Creatinine 1.17 0.70 - 1.30 mg/dL    BUN/Creatinine ratio 15 12 - 20      eGFR >60 >60 ml/min/1.73m2    Calcium 9.0 8.5 - 10.1 mg/dL   HEPATIC FUNCTION PANEL    Collection Time: 10/28/22 10:45 PM   Result Value Ref Range    Protein, total 7.5 6.4 - 8.2 g/dL    Albumin 3.7 3.5 - 5.0 g/dL    Globulin 3.8 2.0 - 4.0 g/dL    A-G Ratio 1.0 (L) 1.1 - 2.2      Bilirubin, total 0.8 0.2 - 1.0 mg/dL    Bilirubin, direct 0.2 0.0 - 0.2 mg/dL    Alk. phosphatase 71 45 - 117 U/L    AST (SGOT) 24 15 - 37 U/L    ALT (SGPT) 20 12 - 78 U/L   PROTHROMBIN TIME + INR    Collection Time: 10/28/22 10:45 PM   Result Value Ref Range    Prothrombin time 9.8 9.0 - 11.1 sec    INR 1.0 0.9 - 1.1     PTT    Collection Time: 10/28/22 10:45 PM   Result Value Ref Range    aPTT 22.7 22.1 - 31.0 sec    aPTT, therapeutic range   82 - 109 sec   TYPE & SCREEN    Collection Time: 10/28/22 11:30 PM   Result Value Ref Range    Crossmatch Expiration 10/31/2022,2359     ABO/Rh(D) A Positive     Antibody screen Negative        Patient had x-rays of the right femur and 2 views of the hip were done. I reviewed and interpreted x-rays personally.   There is displaced intertrochanteric fracture of right femur. Assessment:     Closed displaced intertrochanteric fracture of right proximal femur    Plan:   -X-rays and clinical findings discussed with the patient. Operative treatment versus nonoperative treatment discussed. Patient is ambulating and very active so operative treatment is more optimal.  -Intramedullary nailing with closed versus open reduction discussed. Risk and benefits of procedure discussed.  -Possible complications including but not limited to infection, nonunion of the fracture, malunion of the fracture, shortening of extremity, failure of fixation, failure of hardware, nerve or vessels injury, tendon injury and blood clot in legs or lungs discussed.  -Patient understood discussion. He will be taken to operating room when the OR is available. - Meanwhile orders for pain medication, SCDs and DVT prophylaxis placed.

## 2022-10-29 NOTE — PROGRESS NOTES
Pt alert leaving the floor for the operating room. Jewelry removed and placed in the top drawer of his night stand with cell phone.

## 2022-10-29 NOTE — PROGRESS NOTES
Reason for Admission:  R hip pain                     RUR Score:   6%                  Plan for utilizing home health:   none       PCP: First and Last name:  None     Name of Practice:    Are you a current patient: Yes/No:    Approximate date of last visit:    Can you participate in a virtual visit with your PCP:                     Current Advanced Directive/Advance Care Plan: No Order      Healthcare Decision Maker:   Click here to complete [address] including selection of the Healthcare Decision Maker Relationship (ie \"Primary\")  Cheryl Close                              Transition of Care Plan:     pt states he lives with his children in a single family home. He denies any DME. Denies any services currently  He stated ind with ADLs and IADLs. HE DOES NOT HAVE A PCP. Discussed options for identifying community support. Ms Shira Arias will provide transport at [place]. The pt stated that he will have [place] paperwork to be completed while he is admitted. His job is [employer]. Surgery expected this date. CM to follow for needs.

## 2022-10-29 NOTE — ED TRIAGE NOTES
Pt states he fell about an hour ago onto his right hip and is complaining of pain from his right hip to his right heel.

## 2022-10-30 LAB
ANION GAP SERPL CALC-SCNC: 6 MMOL/L (ref 5–15)
BUN SERPL-MCNC: 15 MG/DL (ref 6–20)
BUN/CREAT SERPL: 14 (ref 12–20)
CA-I BLD-MCNC: 8.3 MG/DL (ref 8.5–10.1)
CHLORIDE SERPL-SCNC: 102 MMOL/L (ref 97–108)
CO2 SERPL-SCNC: 31 MMOL/L (ref 21–32)
CREAT SERPL-MCNC: 1.09 MG/DL (ref 0.7–1.3)
GLUCOSE SERPL-MCNC: 90 MG/DL (ref 65–100)
HGB BLD-MCNC: 12.6 G/DL (ref 12.1–17)
POTASSIUM SERPL-SCNC: 3.8 MMOL/L (ref 3.5–5.1)
SODIUM SERPL-SCNC: 139 MMOL/L (ref 136–145)

## 2022-10-30 PROCEDURE — 97161 PT EVAL LOW COMPLEX 20 MIN: CPT

## 2022-10-30 PROCEDURE — 74011250637 HC RX REV CODE- 250/637: Performed by: ORTHOPAEDIC SURGERY

## 2022-10-30 PROCEDURE — 74011000250 HC RX REV CODE- 250: Performed by: ORTHOPAEDIC SURGERY

## 2022-10-30 PROCEDURE — 97530 THERAPEUTIC ACTIVITIES: CPT

## 2022-10-30 PROCEDURE — 85018 HEMOGLOBIN: CPT

## 2022-10-30 PROCEDURE — 74011250636 HC RX REV CODE- 250/636: Performed by: ORTHOPAEDIC SURGERY

## 2022-10-30 PROCEDURE — 80048 BASIC METABOLIC PNL TOTAL CA: CPT

## 2022-10-30 PROCEDURE — 65270000029 HC RM PRIVATE

## 2022-10-30 PROCEDURE — 36415 COLL VENOUS BLD VENIPUNCTURE: CPT

## 2022-10-30 RX ADMIN — ACETAMINOPHEN 650 MG: 325 TABLET, FILM COATED ORAL at 05:36

## 2022-10-30 RX ADMIN — SODIUM CHLORIDE, PRESERVATIVE FREE 10 ML: 5 INJECTION INTRAVENOUS at 21:05

## 2022-10-30 RX ADMIN — SODIUM CHLORIDE, PRESERVATIVE FREE 10 ML: 5 INJECTION INTRAVENOUS at 05:36

## 2022-10-30 RX ADMIN — OXYCODONE 5 MG: 5 TABLET ORAL at 17:19

## 2022-10-30 RX ADMIN — SENNOSIDES AND DOCUSATE SODIUM 1 TABLET: 50; 8.6 TABLET ORAL at 08:03

## 2022-10-30 RX ADMIN — CEFAZOLIN 3 G: 1 INJECTION, POWDER, FOR SOLUTION INTRAMUSCULAR; INTRAVENOUS at 02:07

## 2022-10-30 RX ADMIN — OXYCODONE 5 MG: 5 TABLET ORAL at 11:23

## 2022-10-30 RX ADMIN — ACETAMINOPHEN 650 MG: 325 TABLET, FILM COATED ORAL at 00:31

## 2022-10-30 RX ADMIN — SODIUM CHLORIDE, PRESERVATIVE FREE 10 ML: 5 INJECTION INTRAVENOUS at 14:35

## 2022-10-30 RX ADMIN — SODIUM CHLORIDE, POTASSIUM CHLORIDE, SODIUM LACTATE AND CALCIUM CHLORIDE 75 ML/HR: 600; 310; 30; 20 INJECTION, SOLUTION INTRAVENOUS at 05:38

## 2022-10-30 RX ADMIN — OXYCODONE 5 MG: 5 TABLET ORAL at 08:03

## 2022-10-30 RX ADMIN — ACETAMINOPHEN 650 MG: 325 TABLET, FILM COATED ORAL at 17:02

## 2022-10-30 RX ADMIN — ASPIRIN 81 MG: 81 TABLET, COATED ORAL at 08:03

## 2022-10-30 RX ADMIN — ACETAMINOPHEN 650 MG: 325 TABLET, FILM COATED ORAL at 11:24

## 2022-10-30 RX ADMIN — POLYETHYLENE GLYCOL 3350 17 G: 17 POWDER, FOR SOLUTION ORAL at 08:01

## 2022-10-30 RX ADMIN — ASPIRIN 81 MG: 81 TABLET, COATED ORAL at 20:55

## 2022-10-30 RX ADMIN — SENNOSIDES AND DOCUSATE SODIUM 1 TABLET: 50; 8.6 TABLET ORAL at 20:55

## 2022-10-30 NOTE — PROGRESS NOTES
Problem: Falls - Risk of  Goal: *Absence of Falls  Description: Document Kimberli Cruz Fall Risk and appropriate interventions in the flowsheet. Outcome: Progressing Towards Goal  Note: Fall Risk Interventions:  Mobility Interventions: Bed/chair exit alarm, PT Consult for mobility concerns, PT Consult for assist device competence, Strengthening exercises (ROM-active/passive), Utilize walker, cane, or other assistive device         Medication Interventions: Bed/chair exit alarm, Patient to call before getting OOB, Teach patient to arise slowly    Elimination Interventions: Bed/chair exit alarm, Patient to call for help with toileting needs, Urinal in reach, Call light in reach    History of Falls Interventions: Bed/chair exit alarm         Problem: Patient Education: Go to Patient Education Activity  Goal: Patient/Family Education  Outcome: Progressing Towards Goal     Problem: Pressure Injury - Risk of  Goal: *Prevention of pressure injury  Description: Document Sahil Scale and appropriate interventions in the flowsheet. Outcome: Progressing Towards Goal  Note: Pressure Injury Interventions:             Activity Interventions: PT/OT evaluation    Mobility Interventions: HOB 30 degrees or less, PT/OT evaluation    Nutrition Interventions: Document food/fluid/supplement intake, Offer support with meals,snacks and hydration                     Problem: Patient Education: Go to Patient Education Activity  Goal: Patient/Family Education  Outcome: Progressing Towards Goal     Problem: Pain  Goal: *Control of Pain  Outcome: Progressing Towards Goal     Problem: Patient Education: Go to Patient Education Activity  Goal: Patient/Family Education  Outcome: Progressing Towards Goal     Problem: Patient Education: Go to Patient Education Activity  Goal: Patient/Family Education  Outcome: Progressing Towards Goal     Problem: Hip Fracture:Day of Admission Pre-op  Goal: Off Pathway (Use only if patient is Off Pathway)  Outcome: Progressing Towards Goal  Goal: Activity/Safety  Outcome: Progressing Towards Goal  Goal: Consults, if ordered  Outcome: Progressing Towards Goal  Goal: Diagnostic Test/Procedures  Outcome: Progressing Towards Goal  Goal: Nutrition/Diet  Outcome: Progressing Towards Goal  Goal: Medications  Outcome: Progressing Towards Goal  Goal: Respiratory  Outcome: Progressing Towards Goal  Goal: Treatments/Interventions/Procedures  Outcome: Progressing Towards Goal  Goal: Psychosocial  Outcome: Progressing Towards Goal  Goal: *Labs/Tests Within Defined Limits in Preparation for Surgery  Outcome: Progressing Towards Goal  Goal: *Optimal pain control at patient's stated goal  Outcome: Progressing Towards Goal  Goal: *Hemodynamically stable  Outcome: Progressing Towards Goal     Problem: Hip Fracture:Day of Admission Pre-op  Goal: Off Pathway (Use only if patient is Off Pathway)  Outcome: Progressing Towards Goal  Goal: Activity/Safety  Outcome: Progressing Towards Goal  Goal: Consults, if ordered  Outcome: Progressing Towards Goal  Goal: Diagnostic Test/Procedures  Outcome: Progressing Towards Goal  Goal: Nutrition/Diet  Outcome: Progressing Towards Goal  Goal: Medications  Outcome: Progressing Towards Goal  Goal: Respiratory  Outcome: Progressing Towards Goal  Goal: Treatments/Interventions/Procedures  Outcome: Progressing Towards Goal  Goal: Psychosocial  Outcome: Progressing Towards Goal  Goal: *Labs/Tests Within Defined Limits in Preparation for Surgery  Outcome: Progressing Towards Goal  Goal: *Optimal pain control at patient's stated goal  Outcome: Progressing Towards Goal  Goal: *Hemodynamically stable  Outcome: Progressing Towards Goal     Problem: Hip Fracture:Day of Admission Pre-op  Goal: Off Pathway (Use only if patient is Off Pathway)  Outcome: Progressing Towards Goal  Goal: Activity/Safety  Outcome: Progressing Towards Goal  Goal: Consults, if ordered  Outcome: Progressing Towards Goal  Goal: Diagnostic Test/Procedures  Outcome: Progressing Towards Goal  Goal: Nutrition/Diet  Outcome: Progressing Towards Goal  Goal: Medications  Outcome: Progressing Towards Goal  Goal: Respiratory  Outcome: Progressing Towards Goal  Goal: Treatments/Interventions/Procedures  Outcome: Progressing Towards Goal  Goal: Psychosocial  Outcome: Progressing Towards Goal  Goal: *Labs/Tests Within Defined Limits in Preparation for Surgery  Outcome: Progressing Towards Goal  Goal: *Optimal pain control at patient's stated goal  Outcome: Progressing Towards Goal  Goal: *Hemodynamically stable  Outcome: Progressing Towards Goal

## 2022-10-30 NOTE — PROGRESS NOTES
PHYSICAL THERAPY EVALUATION  Patient: Giovanna Loredo (19 y.o. male)  Date: 10/30/2022  Primary Diagnosis: Closed right hip fracture (HCC) [S72.001A]  Procedure(s) (LRB):  ORIF Right Hip (Right) 1 Day Post-Op   Precautions: wbat rle      ASSESSMENT  Pt is a 46 y.o. male admitted on 10/28/2022 for fall and fx rt hip, ; pt currently being treated for s/p ORIF RLE . Pt semi supine  upon PT arrival, agreeable to evaluation. Pt A&O x 4. Based on the objective data described, the patient presents with generalized weakness, impaired functional mobility, impaired amb, impaired balance, and decreased endurance to activities. (See below for objective details and assist levels). Overall pt tolerated session good today with PT. Pt will benefit from continued skilled PT to address above deficits and return to PLOF. Current PT DC recommendation Home with Home Health Therapy once medically appropriate. Stair training verbally educated and patient verbalized competence. Current Level of Function Impacting Discharge (mobility/balance): gen decreased rom ,strength and endurance to activities    Other factors to consider for discharge: HH      PLAN :  Recommendations and Planned Interventions: bed mobility training, transfer training, gait training, therapeutic exercises, patient and family training/education, and therapeutic activities      Recommend for staff: Out of bed to chair for meals, Encourage HEP in prep for ADLs/mobility, Amb to bathroom for toileting with gt belt and AD, LE elevation for management of edema, and Amb in hallway    Frequency/Duration: Patient will be followed by physical therapy:  3-5x/week to address goals.     Recommendation for discharge: (in order for the patient to meet his/her long term goals)  Home with [address] [place] [address]    This discharge recommendation:  Has been made in collaboration with the attending provider and/or case management    IF patient discharges home will need the following DME: rolling walker         SUBJECTIVE:   Patient stated I am ok.     OBJECTIVE DATA SUMMARY:   HISTORY:    History reviewed. No pertinent past medical history. History reviewed. No pertinent surgical history. Home Situation  Home Environment: Private residence  # Steps to Enter: 5  Rails to Enter: Yes  One/Two Story Residence: One story  Living Alone: No  Support Systems: Child(lanre)  Patient Expects to be Discharged to[de-identified] Home with home health  Current DME Used/Available at Home: Wheelchair    EXAMINATION/PRESENTATION/DECISION MAKING:   Critical Behavior:  Neurologic State: Alert  Orientation Level: Oriented X4  Cognition: Follows commands     Hearing: Auditory  Auditory Impairment: None  Skin:  dressing intact  Edema: RLE  Range Of Motion:  AROM: Generally decreased, functional                       Strength:    Strength: Generally decreased, functional                    Tone & Sensation:   Tone: Normal                                   Functional Mobility:  Bed Mobility:  Rolling: Modified independent  Supine to Sit: Modified independent  Sit to Supine: Modified independent  Scooting: Modified independent  Transfers:  Sit to Stand: Contact guard assistance  Stand to Sit: Contact guard assistance                       Balance:   Sitting: Intact  Standing: Impaired; With support  Standing - Static: Fair;Constant support  Standing - Dynamic : Fair;Constant support  Ambulation/Gait Training:  Distance (ft): 100 Feet (ft)  Assistive Device: Gait belt;Walker, rolling  Ambulation - Level of Assistance: Contact guard assistance     Gait Description (WDL): Exceptions to WDL     Right Side Weight Bearing: As tolerated                                  Functional Measure:  [employer] AM-[employer] 6 Clicks         Basic Mobility Inpatient Short Form  How much difficulty does the patient currently have. .. Unable A Lot A Little None   1. Turning over in bed (including adjusting bedclothes, sheets and blankets)?    [] 1   [] 2 [x] 3   [] 4   2. Sitting down on and standing up from a chair with arms ( e.g., wheelchair, bedside commode, etc.)   [] 1   [] 2   [x] 3   [] 4   3. Moving from lying on back to sitting on the side of the bed? [] 1   [] 2   [x] 3   [] 4          How much help from another person does the patient currently need. .. Total A Lot A Little None   4. Moving to and from a bed to a chair (including a wheelchair)? [] 1   [] 2   [x] 3   [] 4   5. Need to walk in hospital room? [] 1   [] 2   [x] 3   [] 4   6. Climbing 3-5 steps with a railing? [] 1   [] 2   [x] 3   [] 4   © , Trustees of [facility], under license to [employer]. All rights reserved     Score:  Initial:  Most Recent: X (Date: 10/30/2022 )   Interpretation of Tool:  Represents activities that are increasingly more difficult (i.e. Bed mobility, Transfers, Gait). Score 24 23 22-20 19-15 14-10 9-7 6   Modifier CH CI CJ CK CL CM CN         Physical Therapy Evaluation Charge Determination   History Examination Presentation Decision-Making   LOW Complexity : Zero comorbidities / personal factors that will impact the outcome / POC LOW Complexity : 1-2 Standardized tests and measures addressing body structure, function, activity limitation and / or participation in recreation  LOW Complexity : Stable, uncomplicated  Other outcome measures [facility] 6        Based on the above components, the patient evaluation is determined to be of the following complexity level: LOW     Pain Ratin/10 rt le    Activity Tolerance:   Good    After treatment patient left in no apparent distress:   Bed locked and in lowest position Supine in bed, Heels elevated for pressure relief, and Call bell within reach .     GOALS:    Problem: Mobility Impaired (Adult and Pediatric)  Goal: *Acute Goals and Plan of Care (Insert Text)  Description: Patient stated goal: good  Patient will move from supine to sit and sit to supine , scoot up and down, and roll side to side in bed with modified independence within 7 day(s). Patient will transfer from bed to chair and chair to bed with modified independence using the least restrictive device within 7 day(s). Patient will improve static standing balance to modified independence within 1 week(s). Patient will ambulate 150 feet with modified independence with least restrictive device within 1 weeks. Patient will be able to do 4 steps up and down with rail and sup x 7 days wbat   Outcome: Progressing Towards Goal       COMMUNICATION/EDUCATION:   The patients plan of care was discussed with: Registered nurse and Case management. Fall prevention education was provided and the patient/caregiver indicated understanding., Patient/family have participated as able in goal setting and plan of care. , and Patient/family agree to work toward stated goals and plan of care.          Thank you for this referral.  Rosa Maria Matthews, PT   Time Calculation: 22 mins

## 2022-10-30 NOTE — ANESTHESIA POSTPROCEDURE EVALUATION
Procedure(s):  ORIF Right Hip.    general    Anesthesia Post Evaluation        Patient location during evaluation: PACU  Patient participation: complete - patient participated  Level of consciousness: awake  Pain score: 0  Pain management: adequate  Airway patency: patent  Anesthetic complications: no  Cardiovascular status: acceptable  Respiratory status: acceptable  Hydration status: acceptable  Post anesthesia nausea and vomiting:  controlled  Final Post Anesthesia Temperature Assessment:  Normothermia (36.0-37.5 degrees C)      INITIAL Post-op Vital signs:   Vitals Value Taken Time   /92 10/29/22 1837   Temp 37 °C (98.6 °F) 10/29/22 1835   Pulse 98 10/29/22 1839   Resp 27 10/29/22 1839   SpO2 100 % 10/29/22 1839   Vitals shown include unvalidated device data.

## 2022-10-30 NOTE — PROGRESS NOTES
Jina Flores  [id number]  10/30/2022    Subjective:  - pain 3/10  -No complaints of nausea or vomiting  -No complaints of chest pain    Patient walked with physical therapist today.       Objective:  - dressing clean dry and intact  -No abnormal swelling  -fingers/Toes movement full  -Dorsalis pedis and posterior tibial +2  -Radial pulse 2+  -Sensation intact   -SCDs in situ    Vitals:  Patient Vitals for the past 24 hrs:   Temp Pulse Resp BP SpO2   10/30/22 1445 99.1 °F (37.3 °C) 92 18 (!) 157/82 97 %   10/30/22 0801 98 °F (36.7 °C) 82 18 (!) 146/85 98 %   10/30/22 0215 98.2 °F (36.8 °C) 77 18 (!) 149/88 96 %   10/30/22 0032 98.1 °F (36.7 °C) 81 18 (!) 146/84 96 %   10/29/22 2127 98.2 °F (36.8 °C) 97 18 (!) 169/92 100 %   10/29/22 2018 98.3 °F (36.8 °C) (!) 102 18 (!) 159/87 97 %   10/29/22 1932 98.5 °F (36.9 °C) 100 18 (!) 171/81 96 %   10/29/22 1835 98.6 °F (37 °C) 94 24 (!) 179/92 99 %   10/29/22 1832 -- 77 -- (!) 193/93 --   10/29/22 1830 -- 78 20 (!) 193/93 100 %   10/29/22 1825 -- 80 19 -- 100 %   10/29/22 1820 -- 95 24 (!) 186/102 100 %   10/29/22 1815 -- 91 22 (!) 202/104 99 %   10/29/22 1810 -- 98 19 -- 99 %   10/29/22 1805 -- 89 20 (!) 189/98 98 %   10/29/22 1800 -- 97 19 (!) 207/115 92 %   10/29/22 1758 -- 89 -- (!) 203/115 --   10/29/22 1755 -- 94 18 (!) 203/115 93 %   10/29/22 1750 -- 95 16 (!) 213/120 94 %   10/29/22 1745 -- 94 20 (!) 221/178 93 %   10/29/22 1739 98.4 °F (36.9 °C) 95 18 (!) 183/109 94 %   10/29/22 1735 98.4 °F (36.9 °C) 100 20 (!) 187/110 93 %        Labs:  Recent Results (from the past 24 hour(s))   METABOLIC PANEL, BASIC    Collection Time: 10/30/22 12:18 PM   Result Value Ref Range    Sodium 139 136 - 145 mmol/L    Potassium 3.8 3.5 - 5.1 mmol/L    Chloride 102 97 - 108 mmol/L    CO2 31 21 - 32 mmol/L    Anion gap 6 5 - 15 mmol/L    Glucose 90 65 - 100 mg/dL    BUN 15 6 - 20 mg/dL    Creatinine 1.09 0.70 - 1.30 mg/dL    BUN/Creatinine ratio 14 12 - 20      eGFR >60 >60 ml/min/1.73m2    Calcium 8.3 (L) 8.5 - 10.1 mg/dL   HEMOGLOBIN    Collection Time: 10/30/22 12:18 PM   Result Value Ref Range    HGB 12.6 12.1 - 17.0 g/dL          Assessment:  POD #1 right hip intramedullary nailing for intertrochanteric fracture    Plan/recommendations:  -Weightbearing as tolerated walking  -Physical therapy  -Oral and IV pain medication as needed  -SCDs, aspirin, early ambulation and ankle movement for DVT prophylaxis  -Plan to discharge home tomorrow

## 2022-10-31 VITALS
OXYGEN SATURATION: 97 % | RESPIRATION RATE: 18 BRPM | HEIGHT: 73 IN | WEIGHT: 280 LBS | TEMPERATURE: 98.4 F | DIASTOLIC BLOOD PRESSURE: 89 MMHG | HEART RATE: 73 BPM | BODY MASS INDEX: 37.11 KG/M2 | SYSTOLIC BLOOD PRESSURE: 161 MMHG

## 2022-10-31 LAB — HGB BLD-MCNC: 12.1 G/DL (ref 12.1–17)

## 2022-10-31 PROCEDURE — 36415 COLL VENOUS BLD VENIPUNCTURE: CPT

## 2022-10-31 PROCEDURE — 74011250637 HC RX REV CODE- 250/637: Performed by: ORTHOPAEDIC SURGERY

## 2022-10-31 PROCEDURE — 97116 GAIT TRAINING THERAPY: CPT

## 2022-10-31 PROCEDURE — 97165 OT EVAL LOW COMPLEX 30 MIN: CPT

## 2022-10-31 PROCEDURE — 97530 THERAPEUTIC ACTIVITIES: CPT

## 2022-10-31 PROCEDURE — 74011250637 HC RX REV CODE- 250/637: Performed by: PHYSICIAN ASSISTANT

## 2022-10-31 PROCEDURE — 97110 THERAPEUTIC EXERCISES: CPT

## 2022-10-31 PROCEDURE — 85018 HEMOGLOBIN: CPT

## 2022-10-31 PROCEDURE — 74011000250 HC RX REV CODE- 250: Performed by: ORTHOPAEDIC SURGERY

## 2022-10-31 RX ORDER — OXYCODONE HYDROCHLORIDE 5 MG/1
10 TABLET ORAL
Status: DISCONTINUED | OUTPATIENT
Start: 2022-10-31 | End: 2022-10-31 | Stop reason: HOSPADM

## 2022-10-31 RX ORDER — ACETAMINOPHEN 500 MG
1000 TABLET ORAL
Qty: 30 TABLET | Refills: 0 | Status: SHIPPED
Start: 2022-10-31

## 2022-10-31 RX ORDER — OXYCODONE HYDROCHLORIDE 5 MG/1
5 TABLET ORAL
Status: DISCONTINUED | OUTPATIENT
Start: 2022-10-31 | End: 2022-10-31 | Stop reason: HOSPADM

## 2022-10-31 RX ORDER — ACETAMINOPHEN 500 MG
1000 TABLET ORAL EVERY 6 HOURS
Status: DISCONTINUED | OUTPATIENT
Start: 2022-10-31 | End: 2022-10-31 | Stop reason: HOSPADM

## 2022-10-31 RX ORDER — ASPIRIN 81 MG/1
81 TABLET ORAL 2 TIMES DAILY
Qty: 42 TABLET | Refills: 0 | Status: SHIPPED | OUTPATIENT
Start: 2022-10-31 | End: 2022-11-21

## 2022-10-31 RX ORDER — OXYCODONE HYDROCHLORIDE 5 MG/1
5 TABLET ORAL
Status: COMPLETED | OUTPATIENT
Start: 2022-10-31 | End: 2022-10-31

## 2022-10-31 RX ORDER — OXYCODONE HYDROCHLORIDE 5 MG/1
5 TABLET ORAL
Qty: 15 TABLET | Refills: 0 | Status: SHIPPED | OUTPATIENT
Start: 2022-10-31 | End: 2022-11-04

## 2022-10-31 RX ORDER — KETOROLAC TROMETHAMINE 30 MG/ML
30 INJECTION, SOLUTION INTRAMUSCULAR; INTRAVENOUS EVERY 6 HOURS
Status: DISCONTINUED | OUTPATIENT
Start: 2022-10-31 | End: 2022-10-31 | Stop reason: HOSPADM

## 2022-10-31 RX ADMIN — SENNOSIDES AND DOCUSATE SODIUM 1 TABLET: 50; 8.6 TABLET ORAL at 08:41

## 2022-10-31 RX ADMIN — OXYCODONE 5 MG: 5 TABLET ORAL at 08:40

## 2022-10-31 RX ADMIN — ACETAMINOPHEN 1000 MG: 500 TABLET ORAL at 12:17

## 2022-10-31 RX ADMIN — ASPIRIN 81 MG: 81 TABLET, COATED ORAL at 08:41

## 2022-10-31 RX ADMIN — ACETAMINOPHEN 650 MG: 325 TABLET, FILM COATED ORAL at 05:44

## 2022-10-31 RX ADMIN — ACETAMINOPHEN 650 MG: 325 TABLET, FILM COATED ORAL at 00:13

## 2022-10-31 RX ADMIN — POLYETHYLENE GLYCOL 3350 17 G: 17 POWDER, FOR SOLUTION ORAL at 08:40

## 2022-10-31 RX ADMIN — SODIUM CHLORIDE, PRESERVATIVE FREE 10 ML: 5 INJECTION INTRAVENOUS at 05:44

## 2022-10-31 RX ADMIN — OXYCODONE 5 MG: 5 TABLET ORAL at 10:53

## 2022-10-31 NOTE — PROGRESS NOTES
CM met with patient at bedside to discuss recommendations for home health. Patient is agreeable to home health and is inquiring about DME as well. Choice obtained for no preference at this time.   CM sent referrals, patient has been accepted with [facility] and is pending processing for DME at [facility].

## 2022-10-31 NOTE — PROGRESS NOTES
Discharge note:    VSS. PIV removed. Patient discharged to home self care. Follow up appointment made. Transport provided by significant other. Prescriptions sent electronically. Patient to  walker and commode on his way home. PCT took patient down in wheelchair. Patient left at 1410.

## 2022-10-31 NOTE — PROGRESS NOTES
OCCUPATIONAL THERAPY EVALUATION  Patient: Shania Ochoa (72 y.o. male)  Date: 10/31/2022  Primary Diagnosis: Closed right hip fracture ([address] [S72.001A]  Procedure(s) (LRB):  ORIF Right Hip (Right) 2 Days Post-Op   Precautions: fall risk, WBAT RLE       ASSESSMENT  Pt is a 46 y.o. male [facility] with c/o falling down steps with R hip injury, admitted 10/28/22 and currently being treated for a closed displaced intertrochanteric fracutre of R proximal femur. Pt is s/p closed reduction and intramedullary nailing of R hip completed 10/29/22 by Dr. Nikolay Vasquez. Pt is WBAT RLE. Pt received semi-supine in bed upon arrival, AXO x4, and agreeable to OT evaluation. Based on current observations, pt presents with deficits in generalized strength, standing balance (see PT note for gait details), functional activity tolerance, and decreased safety awareness currently impacting overall performance of ADLs and functional transfers/mobility (see below for objective details and assist levels). Overall, pt tolerates session fair with pt able to tolerate bed mobility, ambulating to and from bathroom, standing at the sink for grooming, and completing toilet transfer with mod I-CGA overall. Socks doffed/donned while seated EOB with mod A as pt req's total A to doff/don RLE sock. Face washing and tooth brushing completed at sink with SBA. Pt educated on hip precautions (no bending past 90 degrees, crossing, or IR/ER of RLE). Pt verbalized understanding. Pt educated to push from/reach back for bed during sit<>stand transfers with pt verbalizing understanding. Pt would benefit from continued skilled OT services to address current impairments and improve IND and safety with self cares and functional transfers/mobility. Current OT d/c recommendation Home with Home Health Therapy once medically appropriate.     Other factors to consider for discharge: family/social support, DME, time since onset, severity of deficits, functional baseline     Patient will benefit from skilled therapy intervention to address the above noted impairments. PLAN :  Recommendations and Planned Interventions: self care training, functional mobility training, therapeutic exercise, balance training, therapeutic activities, endurance activities, patient education, and home safety training    Recommend with staff: Out of bed to chair for meals, Encourage HEP in prep for ADLs/mobility, and Amb to bathroom for toileting with gt belt and AD    Recommend next session: Toileting, LB dressing , and Standing grooming    Frequency/Duration: Patient will be followed by occupational therapy:  3-5x/week to address goals. Recommendation for discharge: (in order for the patient to meet his/her long term goals)  Home with [address] [place] [address]    This discharge recommendation:  Has been made in collaboration with the attending provider and/or case management    IF patient discharges home will need the following DME: AE: long handled bathing, AE: long handled dressing, shower chair, and 3:1 commode or raised toilet seat       SUBJECTIVE:   Patient stated Am I doing okay?     OBJECTIVE DATA SUMMARY:   HISTORY:   History reviewed. No pertinent past medical history. History reviewed. No pertinent surgical history. Per pt:   Home Situation  Home Environment: Private residence  # Steps to Enter: 4  Rails to Enter: Yes  Hand Rails : Bilateral  One/Two Story Residence: One story  Living Alone: No  Support Systems: Child(lanre)  Patient Expects to be Discharged to[de-identified] Home with home health  Current DME Used/Available at Home: None  Tub or Shower Type: Tub/Shower combination (Tub/shower and walk in shower)    Hand dominance: Right    EXAMINATION OF PERFORMANCE DEFICITS:  Cognitive/Behavioral Status:  Neurologic State: Alert  Orientation Level: Oriented X4  Cognition: Appropriate decision making; Appropriate for age attention/concentration; Follows commands        Safety/Judgement: Awareness of environment; Insight into deficits    Hearing: Auditory  Auditory Impairment: None    Range of Motion:  AROM: Within functional limits    Strength:  Strength: Within functional limits    Coordination:  Fine Motor Skills-Upper: Left Intact; Right Intact    Gross Motor Skills-Upper: Left Intact; Right Intact    Tone & Sensation:  Tone: Normal    Balance:  Sitting: Intact  Standing: Impaired; With support  Standing - Static: Good;Constant support  Standing - Dynamic : Fair;Constant support    Functional Mobility and Transfers for ADLs:  Bed Mobility:  Rolling: Modified independent  Supine to Sit: Modified independent  Sit to Supine: Modified independent  Scooting: Modified independent    Transfers:  Sit to Stand: Stand-by assistance;Contact guard assistance  Stand to Sit: Stand-by assistance;Contact guard assistance  Bathroom Mobility: Stand-by assistance;Contact guard assistance  Toilet Transfer : Stand-by assistance;Contact guard assistance      ADL Intervention and task modifications:  Grooming  Position Performed: Standing (At sink)  Washing Face: Stand-by assistance  Brushing Teeth: Stand-by assistance    Lower Body Dressing Assistance  Socks: Moderate assistance (Doff/don LLE with mod I, total A to doff/don RLE)    Cognitive Retraining  Safety/Judgement: Awareness of environment; Insight into deficits    Therapeutic Exercise:  Pt would benefit from UE HEP initiated at next session. Functional Measure:    [employer] AM-PACTM \"6 Clicks\"                                                       Daily Activity Inpatient Short Form  How much help from another person does the patient currently need. .. Total; A Lot A Little None   1. Putting on and taking off regular lower body clothing? []  1 [x]  2 []  3 []  4   2. Bathing (including washing, rinsing, drying)? []  1 []  2 [x]  3 []  4   3. Toileting, which includes using toilet, bedpan or urinal? [] 1 []  2 [x]  3 []  4   4.   Putting on and taking off regular upper body clothing? []  1 []  2 []  3 [x]  4   5. Taking care of personal grooming such as brushing teeth? []  1 []  2 [x]  3 []  4   6. Eating meals? []  1 []  2 []  3 [x]  4   © , Trustees of [facility] [employer], under license to [employer]. All rights reserved     Score:      Interpretation of Tool:  Represents clinically-significant functional categories (i.e. Activities of daily living). Percentage of Impairment CH    0%   CI    1-19% CJ    20-39% CK    40-59% CL    60-79% CM    80-99% CN     100%   [facility]  Score 6-24 24 23 20-22 15-19 10-14 7-9 6     Occupational Therapy Evaluation Charge Determination   History Examination Decision-Making   LOW Complexity : Brief history review  LOW Complexity : 1-3 performance deficits relating to physical, cognitive , or psychosocial skils that result in activity limitations and / or participation restrictions  MEDIUM Complexity : Patient may present with comorbidities that affect occupational performnce. Miniml to moderate modification of tasks or assistance (eg, physical or verbal ) with assesment(s) is necessary to enable patient to complete evaluation       Based on the above components, the patient evaluation is determined to be of the following complexity level: LOW   Pain Ratin/10    Activity Tolerance:   Good    After treatment patient left in no apparent distress:    Supine in bed, Call bell within reach, and Side rails x 3, bed locked and in lowest position    COMMUNICATION/EDUCATION:   The patients plan of care was discussed with: Physical therapy assistant and Registered nurse. Patient/family agree to work toward stated goals and plan of care. This patients plan of care is appropriate for delegation to [facility].      Thank you for this referral.  Hilda Ross OT  Time Calculation: 24 mins    Problem: Self Care Deficits Care Plan (Adult)  Goal: *Acute Goals and Plan of Care (Insert Text)  Description:   FUNCTIONAL STATUS PRIOR TO ADMISSION: Patient was independent and active without use of DME. Patient was independent for basic and instrumental ADLs. HOME SUPPORT: The patient lived with his children but did not require assist.    Occupational Therapy Goals  Initiated 10/31/2022  Pt goal: Return to being IND. 1.  Patient will perform lower body dressing with modified independence within 7 day(s). 2.  Patient will perform grooming with independence within 7 day(s). 3.  Patient will perform bathing with modified independence within 7 day(s). 4.  Patient will perform toilet transfers with modified independence within 7 day(s). 5.  Patient will perform all aspects of toileting with modified independence within 7 day(s). 6.  Patient will participate in upper extremity therapeutic exercise/activities with independence within 7 day(s). 7.  Patient will utilize energy conservation techniques during functional activities with verbal cues within 7 day(s).   Outcome: Not Met

## 2022-10-31 NOTE — PROGRESS NOTES
Problem: Falls - Risk of  Goal: *Absence of Falls  Description: Document Radha Manuelnirali Fall Risk and appropriate interventions in the flowsheet. Outcome: Progressing Towards Goal  Note: Fall Risk Interventions:  Mobility Interventions: Bed/chair exit alarm, Patient to call before getting OOB, Utilize walker, cane, or other assistive device         Medication Interventions: Patient to call before getting OOB, Bed/chair exit alarm, Teach patient to arise slowly    Elimination Interventions: Bed/chair exit alarm, Call light in reach, Patient to call for help with toileting needs    History of Falls Interventions: Bed/chair exit alarm         Problem: Pressure Injury - Risk of  Goal: *Prevention of pressure injury  Description: Document Sahil Scale and appropriate interventions in the flowsheet. Outcome: Progressing Towards Goal  Note: Pressure Injury Interventions:             Activity Interventions: PT/OT evaluation, Increase time out of bed    Mobility Interventions: HOB 30 degrees or less, PT/OT evaluation    Nutrition Interventions: Document food/fluid/supplement intake    Friction and Shear Interventions: Minimize layers                Problem: Pain  Goal: *Control of Pain  Outcome: Progressing Towards Goal

## 2022-10-31 NOTE — PROGRESS NOTES
Orthopedic progress note    Date:10/31/2022       Room:[facility]  Patient Name:Cj Wolf     YOB: 1970     Age:52 y.o. Subjective    80-year-old male status post ORIF right hip. Postop day #2. Patient doing well. He is complaining of mild to moderate soreness of his right thigh. He does feels pain medication helps. He is ambulating well with therapy. No other complaints at this time. Objective           Vitals Last 24 Hours:  TEMPERATURE:  Temp  Av.6 °F (37 °C)  Min: 98.3 °F (36.8 °C)  Max: 99.1 °F (37.3 °C)  RESPIRATIONS RANGE: Resp  Av  Min: 18  Max: 18  PULSE OXIMETRY RANGE: SpO2  Av.5 %  Min: 95 %  Max: 97 %  PULSE RANGE: Pulse  Av.5  Min: 73  Max: 92  BLOOD PRESSURE RANGE: Systolic (56OPJ), YAD:865 , Min:147 , AAF:137   ; Diastolic (04ZLJ), WPL:14, Min:81, Max:89    Current Facility-Administered Medications   Medication Dose Route Frequency    ketorolac (TORADOL) injection 30 mg  30 mg IntraVENous Q6H    acetaminophen (TYLENOL) tablet 1,000 mg  1,000 mg Oral Q6H    oxyCODONE IR (ROXICODONE) tablet 5 mg  5 mg Oral Q4H PRN    oxyCODONE IR (ROXICODONE) tablet 10 mg  10 mg Oral Q4H PRN    sodium chloride (NS) flush 5-40 mL  5-40 mL IntraVENous Q8H    sodium chloride (NS) flush 5-40 mL  5-40 mL IntraVENous PRN    naloxone (NARCAN) injection 0.4 mg  0.4 mg IntraVENous PRN    senna-docusate (PERICOLACE) 8.6-50 mg per tablet 1 Tablet  1 Tablet Oral BID    polyethylene glycol (MIRALAX) packet 17 g  17 g Oral DAILY    bisacodyL (DULCOLAX) suppository 10 mg  10 mg Rectal DAILY PRN    aspirin delayed-release tablet 81 mg  81 mg Oral BID    lactated Ringers infusion  75 mL/hr IntraVENous CONTINUOUS      Review of Systems   Constitutional: Negative for malaise/fatigue. Respiratory: Negative for cough, shortness of breath and wheezing. Cardiovascular: Negative for chest pain and palpitations. Gastrointestinal: Negative for abdominal pain, heartburn, nausea and vomiting. Neurological: Negative for headaches. Musculoskeletal: Denies any numbness/tingling of operative extremity     I/O (24Hr): No intake or output data in the 24 hours ending 10/31/22 1215  Objective:  Vital signs: (most recent): Blood pressure (!) 161/89, pulse 73, temperature 98.4 °F (36.9 °C), resp. rate 18, height 6' 1\" (1.854 m), weight 127 kg (280 lb), SpO2 97 %. Labs/Imaging/Diagnostics    Labs:  CBC:  Recent Labs     10/31/22  1004 10/30/22  1218 10/28/22  2245   WBC  --   --  9.6   RBC  --   --  5.27   HGB 12.1 12.6 15.0   HCT  --   --  47.1   MCV  --   --  89.4   RDW  --   --  13.3   PLT  --   --  223     CHEMISTRIES:  Recent Labs     10/30/22  1218 10/28/22  2245    139   K 3.8 3.8    101   CO2 31 29   BUN 15 17   CREA 1.09 1.17   CA 8.3* 9.0   PT/INR:  Recent Labs     10/28/22  2245   INR 1.0     APTT:  Recent Labs     10/28/22  2245   APTT 22.7     LIVER PROFILE:  Recent Labs     10/28/22  2245   AST 24   ALT 20     Lab Results   Component Value Date/Time    ALT (SGPT) 20 10/28/2022 10:45 PM    AST (SGOT) 24 10/28/2022 10:45 PM    Alk. phosphatase 71 10/28/2022 10:45 PM    Bilirubin, direct 0.2 10/28/2022 10:45 PM    Bilirubin, total 0.8 10/28/2022 10:45 PM       Physical Exam:  Right hip: Dressing was removed by me. Proximal incision site is healing well. Staples remain in place. Distal incision site dressing is clean dry and intact. Mild swelling seen the right thigh. Mild tenderness palpation throughout right thigh. 7 station to sharp dull touch was intact throughout right lower extremity. No calf pain to palpation. EHL/DF/PF 5 out of 5. Right lower extremity neurovascularly intact. Assessment//Plan           Patient Active Problem List    Diagnosis Date Noted    Closed right hip fracture ([address] 10/28/2022     Status post closed reduction percutaneous pinning right hip. Postop day #2. New Mepilex Ag postop dressing was applied today to proximal incision site.   Dressing of distal incision site remained intact. Will discharge patient home today. Patient will follow up with Dr. Zachary Murray as outpatient in approximately 2 weeks.       Electronically signed by Ash Caldwell PA-C on 10/31/2022 at 12:15 PM

## 2022-10-31 NOTE — PROGRESS NOTES
PHYSICAL THERAPY TREATMENT  Patient: Jacelyn Skiff (42 y.o. male)  Date: 10/31/2022  Diagnosis: Closed right hip fracture ([facility] [address] [S72.001A] <principal problem not specified>  Procedure(s) (LRB):  ORIF Right Hip (Right) 2 Days Post-Op  Precautions:    Chart, physical therapy assessment, plan of care and goals were reviewed. ASSESSMENT  Patient continues with skilled PT services and is progressing towards goals. Pt supine in bed upon PT arrival, agreeable to session. (See below for objective details and assist levels). Overall pt tolerated session good today. Patient re educated on WBAT on RLE. Patient then ambulated 115 feet with RW demonstrating slow but steady gait with no LOB or knee buckling noted. Patient then returned to seated EOB and performed seated TE ( see details below) Patient then returned to supine in bed with call bell within reach and all needs meet. Nursing alerted that patient requesting pain meds at end of session  Current Level of Function Impacting Discharge (mobility/balance): impaired balance, general weakness, poor activity tolerance    Other factors to consider for discharge: PLOF, assistance at home, level of deficits, acute medical state         PLAN :  Patient continues to benefit from skilled intervention to address the above impairments. Continue treatment per established plan of care to address goals. Recommendation for discharge: (in order for the patient to meet his/her long term goals)  Home with [address] [place] [address]    This discharge recommendation:  Has been made in collaboration with the attending provider and/or case management    IF patient discharges home will need the following DME: rolling walker       SUBJECTIVE:   Patient stated that's the furthest I have walked since this happened.     OBJECTIVE DATA SUMMARY:   Critical Behavior:  Neurologic State: Alert  Orientation Level: Oriented X4  Cognition: Appropriate decision making, Follows commands  Safety/Judgement: Awareness of environment, Insight into deficits  Functional Mobility Training:  Bed Mobility:  Rolling: Modified independent  Supine to Sit: Modified independent  Sit to Supine: Modified independent  Scooting: Modified independent  Transfers:  Sit to Stand: Stand-by assistance  Stand to Sit: Stand-by assistance  Balance:  Sitting: Intact  Standing: Impaired; With support  Standing - Static: Good;Constant support  Standing - Dynamic : Fair;Constant support  Ambulation/Gait Training:  Distance (ft): 115 Feet (ft)  Assistive Device: Gait belt;Walker, rolling  Ambulation - Level of Assistance: Contact guard assistance  Right Side Weight Bearing: As tolerated    Therapeutic Exercises:   1x15 ankle circles  1x15 LAQ  1x15 seated marches  1x15 hip ADD/ABD     Pain Ratin/10 in Rt hip    Activity Tolerance:   Fair and requires rest breaks    After treatment patient left in no apparent distress:   Bed locked and returned to lowest position, Supine in bed and Call bell within reach      COMMUNICATION/COLLABORATION:   The patients plan of care was discussed with: Registered nurse. GOALS:    Problem: Mobility Impaired (Adult and Pediatric)  Goal: *Acute Goals and Plan of Care (Insert Text)  Description: Patient stated goal: good  Patient will move from supine to sit and sit to supine , scoot up and down, and roll side to side in bed with modified independence within 7 day(s). Patient will transfer from bed to chair and chair to bed with modified independence using the least restrictive device within 7 day(s). Patient will improve static standing balance to modified independence within 1 week(s). Patient will ambulate 150 feet with modified independence with least restrictive device within 1 weeks.      Patient will be able to do 4 steps up and down with rail and sup x 7 days wbat   Outcome: Progressing Towards Goal       Darin Rodas PTA, PT   Time Calculation: 23 mins

## 2022-10-31 NOTE — ADT AUTH CERT NOTES
Facility Name: [facility]                                 Patient Demographics    Patient Name   Kim George, [address] Sex   Male    1970 Address   [address] [address] Phone   [phone] [facility] (Mobile) *[facility] Account    Name Acct ID Class Status Primary Coverage   Minal Mann [id number] INPATIENT Discharged/Not Billed BLUE CROSS - [facility]            Guarantor Account (for Hospital Account [de-identified])    Name Relation to Pt Service Area Active? Acct Type   Minal Mann Self [facility] Yes Personal/Family   Address Phone     [address], [place] [address] [phone](X)              Coverage Information (for Hospital Account [de-identified])    F/O Payor/Plan Subscriber  Subscriber Sex Precert #   BLUE CROSS/[place] BLUE CROSS OUT OF STATE 70 M    Subscriber Subscriber #   Minal Mann [id number]   Grp # Group Name   [id number]    Address Phone   [address]    Policy Number Status Effective Date Benefits Phone   [id number] -  -   Auth/Cert               Diagnosis     Codes Comments   Closed fracture of right hip, initial encounter [facility])  ICD-10-CM: S72.001A   ICD-9-CM: 820.8 Intratrochanteric            Admission Information    Arrival Date/Time: 10/28/2022 2119 Admit Date/Time: 10/28/2022 2132 IP Adm.  Date/Time: 10/28/2022 2244   Admission Type: Emergency Point of Origin: Non-health Care Facility/self Admit Category:    Means of Arrival: Car Primary Service: Surgery Secondary Service: N/A   Transfer Source:  Service Area: [facility] Unit: [facility] MED/SURG   Admit Provider: Mellisa Mandel MD Attending Provider: Lionel Winter MD Referring Provider:      Admission Information    Attending Provider Admission Dx Admitted on    Closed right hip fracture ([facility] [address] 10/28/22   Service Isolation Code Status   SURGERY -- Full Code   Allergies Advance Care Planning    No Known Allergies Jump to the Activity         Admission Information    Unit/Bed: [address] MED/SURG/ Service: SURGERY   Admitting provider: Adriana Santos MD Phone: [phone]   Attending provider:  Phone:    PCP: None Phone:    Admission dx:  Patient class: I   Admission type: ER         Patient Demographics    Patient Name   Nicole Nino   [id number] Legal Sex   Male    1970 Address   [address] [place] [id number] Phone   [phone] (Home)   [phone] (Mobile) *Preferred*       H&P Notes       H&P by Adriana Santos MD at 10/28/22 2345 documented on ED to Hosp-Admission (Discharged) from 10/28/2022 in [address] MED/SURG    Author: Adriana Santos MD Author Type: Physician Filed: 10/29/22 5481   Note Status: Signed Cosign: Cosign Not Required Date of Service: 10/28/22 2345   : Adriana Santos MD (Physician)               Expand All Collapse All  Orthopedic history and physical     Subjective: Delfina Ann is a 46 y.o. -American male fell down from the steps and had injury to the right hip. Emergency room x-rays demonstrated displaced intertrochanteric fracture of the right hip so I was consulted for the same. There is no history of injury anywhere else  Diffuse pain around the hip  Dull aching and spasm kind  8 out of 10  Any movement of lower extremities extremely painful     Past medical history: Obesity     Surgical history: Not significant     Family history: hypertension in both parents     Social History            Tobacco Use    Smoking status: Every Day       Packs/day: 0.25       Types: Cigarettes       Passive exposure: Never    Smokeless tobacco: Never   Substance Use Topics    Alcohol use:  Yes                 Current Facility-Administered Medications   Medication Dose Route Frequency Provider Last Rate Last Admin    morphine injection 1 mg  1 mg IntraVENous Q3H PRN Adriana Santos MD        oxyCODONE IR (ROXICODONE) tablet 5 mg  5 mg Oral Q4H TRISTANN Sherwin Austin MD        [START ON 10/29/2022] acetaminophen (TYLENOL) tablet 650 mg  650 mg Oral Q6H Henry Cerrato MD        lactated Ringers infusion  75 mL/hr IntraVENous CONTINUOUS Lia POWERS MD             No Known Allergies      Review of Systems:   Alert and oriented x3     Objective:         Physical Exam:   Examination of right hip:  -Diffuse tenderness around the hip  -Right lower extremity is shortened and externally rotated  -Any movement of right lower extremity is extremely painful  -Sensation intact  -Toes movement full  -Dorsalis pedis and posterior tibial +2     Data Review:   Recent Results         Recent Results (from the past 24 hour(s))   CBC WITH AUTOMATED DIFF     Collection Time: 10/28/22 10:45 PM   Result Value Ref Range     WBC 9.6 4.1 - 11.1 K/uL     RBC 5.27 4. 10 - 5.70 M/uL     HGB 15.0 12.1 - 17.0 g/dL     HCT 47.1 36.6 - 50.3 %     MCV 89.4 80.0 - 99.0 FL     MCH 28.5 26.0 - 34.0 PG     MCHC 31.8 30.0 - 36.5 g/dL     RDW 13.3 11.5 - 14.5 %     PLATELET 148 518 - 064 K/uL     MPV 10.6 8.9 - 12.9 FL     NEUTROPHILS 81 (H) 32 - 75 %     LYMPHOCYTES 13 12 - 49 %     MONOCYTES 6 5 - 13 %     EOSINOPHILS 0 0 - 7 %     BASOPHILS 0 0 - 1 %     IMMATURE GRANULOCYTES 0 0.0 - 0.5 %     ABS. NEUTROPHILS 7.8 1.8 - 8.0 K/UL     ABS. LYMPHOCYTES 1.2 0.8 - 3.5 K/UL     ABS. MONOCYTES 0.5 0.0 - 1.0 K/UL     ABS. EOSINOPHILS 0.0 0.0 - 0.4 K/UL     ABS. BASOPHILS 0.0 0.0 - 0.1 K/UL     ABS. IMM.  GRANS. 0.0 0.00 - 0.04 K/UL     DF AUTOMATED     METABOLIC PANEL, BASIC     Collection Time: 10/28/22 10:45 PM   Result Value Ref Range     Sodium 139 136 - 145 mmol/L     Potassium 3.8 3.5 - 5.1 mmol/L     Chloride 101 97 - 108 mmol/L     CO2 29 21 - 32 mmol/L     Anion gap 9 5 - 15 mmol/L     Glucose 130 (H) 65 - 100 mg/dL     BUN 17 6 - 20 mg/dL     Creatinine 1.17 0.70 - 1.30 mg/dL     BUN/Creatinine ratio 15 12 - 20       eGFR >60 >60 ml/min/1.73m2 Calcium 9.0 8.5 - 10.1 mg/dL   HEPATIC FUNCTION PANEL     Collection Time: 10/28/22 10:45 PM   Result Value Ref Range     Protein, total 7.5 6.4 - 8.2 g/dL     Albumin 3.7 3.5 - 5.0 g/dL     Globulin 3.8 2.0 - 4.0 g/dL     A-G Ratio 1.0 (L) 1.1 - 2.2       Bilirubin, total 0.8 0.2 - 1.0 mg/dL     Bilirubin, direct 0.2 0.0 - 0.2 mg/dL     Alk. phosphatase 71 45 - 117 U/L     AST (SGOT) 24 15 - 37 U/L     ALT (SGPT) 20 12 - 78 U/L   PROTHROMBIN TIME + INR     Collection Time: 10/28/22 10:45 PM   Result Value Ref Range     Prothrombin time 9.8 9.0 - 11.1 sec     INR 1.0 0.9 - 1.1     PTT     Collection Time: 10/28/22 10:45 PM   Result Value Ref Range     aPTT 22.7 22.1 - 31.0 sec     aPTT, therapeutic range   82 - 109 sec   TYPE & SCREEN     Collection Time: 10/28/22 11:30 PM   Result Value Ref Range     Crossmatch Expiration 10/31/2022,2359       ABO/Rh(D) A Positive       Antibody screen Negative              Patient had x-rays of the right femur and 2 views of the hip were done. I reviewed and interpreted x-rays personally. There is displaced intertrochanteric fracture of right femur. Assessment:      Closed displaced intertrochanteric fracture of right proximal femur     Plan:   -X-rays and clinical findings discussed with the patient. Operative treatment versus nonoperative treatment discussed. Patient is ambulating and very active so operative treatment is more optimal.  -Intramedullary nailing with closed versus open reduction discussed. Risk and benefits of procedure discussed.  -Possible complications including but not limited to infection, nonunion of the fracture, malunion of the fracture, shortening of extremity, failure of fixation, failure of hardware, nerve or vessels injury, tendon injury and blood clot in legs or lungs discussed.  -Patient understood discussion. He will be taken to operating room when the OR is available.   - Meanwhile orders for pain medication, SCDs and DVT prophylaxis placed.                 Patient Demographics    Patient Name   Glen Rdz   [id number] Legal Sex   Male    1970 Address   [address] [place] [address] Phone   [phone] (Home)   [phone] (Mobile) *Preferred*     CSN:   [id number]     Admit Date: Admit Time Room Bed   Oct 28, 2022  9:32 PM [address] [[id number]]       Attending Providers    Provider Pager From To   Yvon Hawkins MD  10/28/22 10/28/22   Rhoda Higgins MD  10/28/22 10/31/22       Patient Contacts    None on File      Utilization Reviews         Musculoskeletal Disease [address] Day 2 (10/29/2022) by Kimberly Monique RN       Review Entered Review Status   10/31/2022 1023 Completed      Criteria Review      Care Day: 2 Care Date: 10/29/2022 Level of Care:    Guideline Day 2    Level Of Care    (X) Floor    10/31/2022 10:23:44 EDT by Becki Lawrence      SURGICAL BED 98.4, HR  95, RR  16-20, 183/109-221/178, 94% RA    Clinical Status    (X) * No ICU or intermediate care needs    Interventions    (X) Inpatient interventions continue    10/31/2022 10:23:44 EDT by Becki Lawrence      PT/OT CONSULT, OOB WITH ASSIST, WBAT, IS Q1H, REGULAR DIET, HGB QD  LR @ 75, MORPHINE 4 MG IV Q4H PRN - X2, ROXICODONE 10 MG PO Q4H PRN - X2, BENADRYL 12.5 MG IV PRN - X2, DILAUDID 1 MG IV ONCE, FENTANYL  50 MCG IV ONCE,    10/31/2022 10:23:44 EDT by Becki Lawrence    Subject: Additional Clinical Information    APRESOLINE 10 MG IV Q10MIN PRN BP >  130/90- X2, DILAUDID  0.5 MG IV X2, MORPHINE 1 MG IV Q3H PRN - X2,       * Milestone   Additional Notes   10/29            OP NOTE - Pre-operative diagnosis: Intertrochanteric fracture of the right hip       Post-operative diagnosis: intertrochanteric fracture of right hip       Surgery Performed:  Closed reduction and intramedullary nailing of right hip intertrochanteric fracture       Assistants:  none       Anesthesia:  General       Estimated Blood Loss:  250 cc Complications: none       Specimen:  none       Implants:    : Synthes    11 mm x intermidiate X 130 degree TFNA    674 mm long helical blade    42 mm long distal locking screw                 Musculoskeletal Disease GRG - Care Day 1 (10/28/2022) by Teri Hernandez RN       Review Entered Review Status   10/31/2022 1012 Completed      Criteria Review      Care Day: 1 Care Date: 10/28/2022 Level of Care:    Guideline Day 1    Level Of Care    ( ) ICU or intermediate care    10/31/2022 10:12:11 EDT by Denzel, Rhoda E [facility] BED  97.8, HR  68, RR  18,  121/80,  99% RA    Clinical Status    (X) * Clinical Indications met    Interventions    (X) Inpatient interventions as needed    10/31/2022 10:12:11 EDT by Osei Ashraf      NS @ 150ML IV ONCE, ED MED - MORPHINE 4 MG IV ONCE, ZOFRAN 4 MG IV ONCE  HGB QD, REGULAR DIET, IS Q1H, SCD'S, PT/OT CONSULT, OOB IN CHAIR, OOB WITH ASSIST  HGB 15 HCT  47.1, WBC  9.6, NEUT 81, PT  9.8 INR  1.0, PTT  22.7, , K  3.8, GLUC 130    * Milestone   Additional Notes   RIGHT FEMUR XR- Chronic appearing foreshortened and varus angulated   intertrochanteric right femur fracture. ED NOTE - 46 y.o. male presents to the ED with cc of right hip pain. Patient complains of right hip pain after a ground-level fall that occurred about 1 hour prior to arrival.  Patient states the pain has been constant and severe degree with any movement or ambulation aggravating the pain. No head injury. No other injury. No chest pain shortness of breath or abdominal pain. No neck or back pain. No other extremity pain. No paresthesia or motor dysfunction. Patient denies any history of hip pathology. Physical Exam   Vitals and nursing note reviewed. Constitutional:        General: He is not in acute distress. Appearance: Normal appearance. He is not ill-appearing, toxic-appearing or diaphoretic. HENT:       Head: Normocephalic and atraumatic.        Right Ear: External ear normal.       Left Ear: External ear normal.       Nose: Nose normal.       Mouth/Throat:       Mouth: Mucous membranes are moist.    Eyes:       Extraocular Movements: Extraocular movements intact. Conjunctiva/sclera: Conjunctivae normal.       Pupils: Pupils are equal, round, and reactive to light. Cardiovascular:       Rate and Rhythm: Normal rate and regular rhythm. Heart sounds: Normal heart sounds. Pulmonary:       Effort: Pulmonary effort is normal.       Breath sounds: Normal breath sounds. Chest:       Chest wall: No deformity or tenderness. Abdominal:       General: Abdomen is flat. Bowel sounds are normal.       Palpations: Abdomen is soft. Tenderness: There is no abdominal tenderness. There is no guarding or rebound. Musculoskeletal:       Cervical back: Normal range of motion and neck supple. No tenderness. No muscular tenderness. Comments: Right hip there is externally rotated mildly shortened. Right hip is tender to palpation. .  Pedal pulses palpable. No signs of foot drop. Skin:      General: Skin is warm and dry. Findings: No bruising. Neurological:       General: No focal deficit present. Mental Status: He is alert and oriented to person, place, and time. Psychiatric:          Behavior: Behavior normal.          Thought Content: Thought content normal.       H&P - Physical Exam:    Examination of right hip:   -Diffuse tenderness around the hip   -Right lower extremity is shortened and externally rotated   -Any movement of right lower extremity is extremely painful   -Sensation intact   -Toes movement full   -Dorsalis pedis and posterior tibial +2   ,    Assessment:       Closed displaced intertrochanteric fracture of right proximal femur       Plan:   -X-rays and clinical findings discussed with the patient. Operative treatment versus nonoperative treatment discussed.   Patient is ambulating and very active so operative treatment is more optimal.   -Intramedullary nailing with closed versus open reduction discussed. Risk and benefits of procedure discussed.   -Possible complications including but not limited to infection, nonunion of the fracture, malunion of the fracture, shortening of extremity, failure of fixation, failure of hardware, nerve or vessels injury, tendon injury and blood clot in legs or lungs discussed.   -Patient understood discussion. He will be taken to operating room when the OR is available. - Meanwhile orders for pain medication, SCDs and DVT prophylaxis placed.            Musculoskeletal Disease GRG - Clinical Indications for Admission to Inpatient Care by Santiago Benedict RN       Review Entered Review Status   10/31/2022 1006 Completed      Criteria Review      Clinical Indications for Admission to Inpatient Care    Most Recent : Farida Doty Most Recent Date: 10/31/2022 10:06:59 EDT    (X) Hospital admission is needed for appropriate care of the patient because of  1 or more  of    the following :       (X) Nonvertebral fracture, dislocation, or other musculoskeletal injury requiring inpatient care       (medical), as indicated by  1 or more  of the following  (13) (12) (16) (25) (23) (21)       (21):          (X) Major injury requiring inpatient stabilization such as traction initiation or external fixation          before internal fixation or closure of complex or open fracture          10/31/2022 10:06:59 EDT by Farida Doty            Closed reduction and intramedullary nailing of right hip intertrochanteric fracture

## 2025-06-05 ENCOUNTER — APPOINTMENT (OUTPATIENT)
Facility: HOSPITAL | Age: 55
DRG: 281 | End: 2025-06-05
Attending: STUDENT IN AN ORGANIZED HEALTH CARE EDUCATION/TRAINING PROGRAM
Payer: COMMERCIAL

## 2025-06-05 ENCOUNTER — HOSPITAL ENCOUNTER (INPATIENT)
Facility: HOSPITAL | Age: 55
LOS: 3 days | Discharge: HOME | DRG: 281 | End: 2025-06-08
Admitting: STUDENT IN AN ORGANIZED HEALTH CARE EDUCATION/TRAINING PROGRAM
Payer: COMMERCIAL

## 2025-06-05 DIAGNOSIS — I16.9 HYPERTENSIVE CRISIS: ICD-10-CM

## 2025-06-05 DIAGNOSIS — I16.1 HYPERTENSIVE EMERGENCY: Primary | ICD-10-CM

## 2025-06-05 LAB
ALBUMIN SERPL-MCNC: 2 G/DL (ref 3.5–5)
ALBUMIN/GLOB SERPL: 0.5 (ref 1.1–2.2)
ALP SERPL-CCNC: 82 U/L (ref 45–117)
ALT SERPL-CCNC: 16 U/L (ref 12–78)
ANION GAP SERPL CALC-SCNC: 5 MMOL/L (ref 2–12)
AST SERPL W P-5'-P-CCNC: 14 U/L (ref 15–37)
BASOPHILS # BLD: 0.02 K/UL (ref 0–0.1)
BASOPHILS NFR BLD: 0.5 % (ref 0–1)
BILIRUB SERPL-MCNC: 1.2 MG/DL (ref 0.2–1)
BUN SERPL-MCNC: 15 MG/DL (ref 6–20)
BUN/CREAT SERPL: 8 (ref 12–20)
CA-I BLD-MCNC: 8.4 MG/DL (ref 8.5–10.1)
CHLORIDE SERPL-SCNC: 110 MMOL/L (ref 97–108)
CO2 SERPL-SCNC: 25 MMOL/L (ref 21–32)
CREAT SERPL-MCNC: 1.87 MG/DL (ref 0.7–1.3)
CREAT UR-MCNC: 70 MG/DL
DIFFERENTIAL METHOD BLD: NORMAL
ECHO AO ASC DIAM: 3.5 CM
ECHO AO ASCENDING AORTA INDEX: 1.39 CM/M2
ECHO AO ROOT DIAM: 3.7 CM
ECHO AO ROOT INDEX: 1.47 CM/M2
ECHO AV AREA PEAK VELOCITY: 3.3 CM2
ECHO AV AREA VTI: 4.5 CM2
ECHO AV AREA/BSA PEAK VELOCITY: 1.3 CM2/M2
ECHO AV AREA/BSA VTI: 1.8 CM2/M2
ECHO AV MEAN GRADIENT: 7 MMHG
ECHO AV MEAN VELOCITY: 1.1 M/S
ECHO AV PEAK GRADIENT: 14 MMHG
ECHO AV PEAK VELOCITY: 1.9 M/S
ECHO AV VELOCITY RATIO: 0.79
ECHO AV VTI: 30.4 CM
ECHO BSA: 2.6 M2
ECHO IVC EXP: 1.2 CM
ECHO LA AREA 4C: 17.6 CM2
ECHO LA DIAMETER INDEX: 1.59 CM/M2
ECHO LA DIAMETER: 4 CM
ECHO LA MAJOR AXIS: 5.5 CM
ECHO LA TO AORTIC ROOT RATIO: 1.08
ECHO LA VOL MOD A4C: 47 ML (ref 18–58)
ECHO LA VOLUME INDEX MOD A4C: 19 ML/M2 (ref 16–34)
ECHO LV E' LATERAL VELOCITY: 5.98 CM/S
ECHO LV E' SEPTAL VELOCITY: 5.98 CM/S
ECHO LV EDV A2C: 86 ML
ECHO LV EDV A4C: 128 ML
ECHO LV EDV INDEX A4C: 51 ML/M2
ECHO LV EDV NDEX A2C: 34 ML/M2
ECHO LV EF PHYSICIAN: 65 %
ECHO LV EJECTION FRACTION A2C: 70 %
ECHO LV EJECTION FRACTION A4C: 68 %
ECHO LV EJECTION FRACTION BIPLANE: 69 % (ref 55–100)
ECHO LV ESV A2C: 25 ML
ECHO LV ESV A4C: 41 ML
ECHO LV ESV INDEX A2C: 10 ML/M2
ECHO LV ESV INDEX A4C: 16 ML/M2
ECHO LV FRACTIONAL SHORTENING: 53 % (ref 28–44)
ECHO LV INTERNAL DIMENSION DIASTOLE INDEX: 1.87 CM/M2
ECHO LV INTERNAL DIMENSION DIASTOLIC: 4.7 CM (ref 4.2–5.9)
ECHO LV INTERNAL DIMENSION SYSTOLIC INDEX: 0.87 CM/M2
ECHO LV INTERNAL DIMENSION SYSTOLIC: 2.2 CM
ECHO LV IVSD: 2 CM (ref 0.6–1)
ECHO LV MASS 2D: 481.2 G (ref 88–224)
ECHO LV MASS INDEX 2D: 191 G/M2 (ref 49–115)
ECHO LV POSTERIOR WALL DIASTOLIC: 2.1 CM (ref 0.6–1)
ECHO LV RELATIVE WALL THICKNESS RATIO: 0.89
ECHO LVOT AREA: 4.2 CM2
ECHO LVOT AV VTI INDEX: 1.08
ECHO LVOT DIAM: 2.3 CM
ECHO LVOT MEAN GRADIENT: 4 MMHG
ECHO LVOT PEAK GRADIENT: 9 MMHG
ECHO LVOT PEAK VELOCITY: 1.5 M/S
ECHO LVOT STROKE VOLUME INDEX: 53.9 ML/M2
ECHO LVOT SV: 135.8 ML
ECHO LVOT VTI: 32.7 CM
ECHO MV A VELOCITY: 1.11 M/S
ECHO MV AREA VTI: 6 CM2
ECHO MV E DECELERATION TIME (DT): 136 MS
ECHO MV E VELOCITY: 0.83 M/S
ECHO MV E/A RATIO: 0.75
ECHO MV E/E' LATERAL: 13.88
ECHO MV E/E' RATIO (AVERAGED): 13.88
ECHO MV E/E' SEPTAL: 13.88
ECHO MV LVOT VTI INDEX: 0.69
ECHO MV MAX VELOCITY: 1.5 M/S
ECHO MV MEAN GRADIENT: 5 MMHG
ECHO MV MEAN VELOCITY: 1 M/S
ECHO MV PEAK GRADIENT: 9 MMHG
ECHO MV VTI: 22.7 CM
ECHO PV ACCELERATION TIME (AT): 132 MS
ECHO PV MAX VELOCITY: 1.5 M/S
ECHO PV PEAK GRADIENT: 9 MMHG
ECHO RA AREA 4C: 16.4 CM2
ECHO RA END SYSTOLIC VOLUME APICAL 4 CHAMBER INDEX BSA: 15 ML/M2
ECHO RA VOLUME: 38 ML
ECHO RV FREE WALL PEAK S': 26.7 CM/S
ECHO RV TAPSE: 3.3 CM (ref 1.7–?)
EKG ATRIAL RATE: 79 BPM
EKG DIAGNOSIS: NORMAL
EKG P AXIS: 66 DEGREES
EKG P-R INTERVAL: 172 MS
EKG Q-T INTERVAL: 404 MS
EKG QRS DURATION: 92 MS
EKG QTC CALCULATION (BAZETT): 463 MS
EKG R AXIS: -38 DEGREES
EKG T AXIS: 61 DEGREES
EKG VENTRICULAR RATE: 79 BPM
EOSINOPHIL # BLD: 0.03 K/UL (ref 0–0.4)
EOSINOPHIL NFR BLD: 0.7 % (ref 0–7)
ERYTHROCYTE [DISTWIDTH] IN BLOOD BY AUTOMATED COUNT: 14.3 % (ref 11.5–14.5)
GLOBULIN SER CALC-MCNC: 3.8 G/DL (ref 2–4)
GLUCOSE BLD STRIP.AUTO-MCNC: 154 MG/DL (ref 65–100)
GLUCOSE SERPL-MCNC: 110 MG/DL (ref 65–100)
HCT VFR BLD AUTO: 43.2 % (ref 36.6–50.3)
HGB BLD-MCNC: 13.9 G/DL (ref 12.1–17)
IMM GRANULOCYTES # BLD AUTO: 0 K/UL (ref 0–0.04)
IMM GRANULOCYTES NFR BLD AUTO: 0 % (ref 0–0.5)
LACTATE SERPL-SCNC: 1.3 MMOL/L (ref 0.4–2)
LYMPHOCYTES # BLD: 1.56 K/UL (ref 0.8–3.5)
LYMPHOCYTES NFR BLD: 38.1 % (ref 12–49)
MCH RBC QN AUTO: 28 PG (ref 26–34)
MCHC RBC AUTO-ENTMCNC: 32.2 G/DL (ref 30–36.5)
MCV RBC AUTO: 86.9 FL (ref 80–99)
MONOCYTES # BLD: 0.27 K/UL (ref 0–1)
MONOCYTES NFR BLD: 6.6 % (ref 5–13)
MRSA DNA SPEC QL NAA+PROBE: NOT DETECTED
NEUTS SEG # BLD: 2.21 K/UL (ref 1.8–8)
NEUTS SEG NFR BLD: 54.1 % (ref 32–75)
NRBC # BLD: 0 K/UL (ref 0–0.01)
NRBC BLD-RTO: 0 PER 100 WBC
PERFORMED BY:: ABNORMAL
PLATELET # BLD AUTO: 271 K/UL (ref 150–400)
PMV BLD AUTO: 10.9 FL (ref 8.9–12.9)
POTASSIUM SERPL-SCNC: 3.8 MMOL/L (ref 3.5–5.1)
PROT SERPL-MCNC: 5.8 G/DL (ref 6.4–8.2)
RBC # BLD AUTO: 4.97 M/UL (ref 4.1–5.7)
SODIUM SERPL-SCNC: 140 MMOL/L (ref 136–145)
SODIUM UR-SCNC: 77 MMOL/L
TROPONIN I SERPL HS-MCNC: 224 NG/L (ref 0–76)
TROPONIN I SERPL HS-MCNC: 249 NG/L (ref 0–76)
WBC # BLD AUTO: 4.1 K/UL (ref 4.1–11.1)

## 2025-06-05 PROCEDURE — 2580000003 HC RX 258

## 2025-06-05 PROCEDURE — 2000000000 HC ICU R&B

## 2025-06-05 PROCEDURE — 85025 COMPLETE CBC W/AUTO DIFF WBC: CPT

## 2025-06-05 PROCEDURE — 83605 ASSAY OF LACTIC ACID: CPT

## 2025-06-05 PROCEDURE — 96375 TX/PRO/DX INJ NEW DRUG ADDON: CPT

## 2025-06-05 PROCEDURE — 96374 THER/PROPH/DIAG INJ IV PUSH: CPT

## 2025-06-05 PROCEDURE — 99291 CRITICAL CARE FIRST HOUR: CPT

## 2025-06-05 PROCEDURE — 80053 COMPREHEN METABOLIC PANEL: CPT

## 2025-06-05 PROCEDURE — 94761 N-INVAS EAR/PLS OXIMETRY MLT: CPT

## 2025-06-05 PROCEDURE — 36415 COLL VENOUS BLD VENIPUNCTURE: CPT

## 2025-06-05 PROCEDURE — 6370000000 HC RX 637 (ALT 250 FOR IP): Performed by: NURSE PRACTITIONER

## 2025-06-05 PROCEDURE — 6360000002 HC RX W HCPCS

## 2025-06-05 PROCEDURE — 84300 ASSAY OF URINE SODIUM: CPT

## 2025-06-05 PROCEDURE — 93005 ELECTROCARDIOGRAM TRACING: CPT

## 2025-06-05 PROCEDURE — 6370000000 HC RX 637 (ALT 250 FOR IP): Performed by: STUDENT IN AN ORGANIZED HEALTH CARE EDUCATION/TRAINING PROGRAM

## 2025-06-05 PROCEDURE — 6360000002 HC RX W HCPCS: Performed by: STUDENT IN AN ORGANIZED HEALTH CARE EDUCATION/TRAINING PROGRAM

## 2025-06-05 PROCEDURE — 87641 MR-STAPH DNA AMP PROBE: CPT

## 2025-06-05 PROCEDURE — 84484 ASSAY OF TROPONIN QUANT: CPT

## 2025-06-05 PROCEDURE — 82570 ASSAY OF URINE CREATININE: CPT

## 2025-06-05 PROCEDURE — 82962 GLUCOSE BLOOD TEST: CPT

## 2025-06-05 PROCEDURE — 6360000002 HC RX W HCPCS: Performed by: NURSE PRACTITIONER

## 2025-06-05 PROCEDURE — 2500000003 HC RX 250 WO HCPCS: Performed by: STUDENT IN AN ORGANIZED HEALTH CARE EDUCATION/TRAINING PROGRAM

## 2025-06-05 PROCEDURE — 93306 TTE W/DOPPLER COMPLETE: CPT

## 2025-06-05 RX ORDER — POTASSIUM CHLORIDE 29.8 MG/ML
20 INJECTION INTRAVENOUS PRN
Status: DISCONTINUED | OUTPATIENT
Start: 2025-06-05 | End: 2025-06-08 | Stop reason: HOSPADM

## 2025-06-05 RX ORDER — HYDRALAZINE HYDROCHLORIDE 50 MG/1
50 TABLET, FILM COATED ORAL EVERY 8 HOURS SCHEDULED
Status: DISCONTINUED | OUTPATIENT
Start: 2025-06-05 | End: 2025-06-06

## 2025-06-05 RX ORDER — AMLODIPINE BESYLATE 5 MG/1
10 TABLET ORAL DAILY
Status: DISCONTINUED | OUTPATIENT
Start: 2025-06-06 | End: 2025-06-08 | Stop reason: HOSPADM

## 2025-06-05 RX ORDER — LABETALOL 100 MG/1
100 TABLET, FILM COATED ORAL EVERY 12 HOURS SCHEDULED
Status: DISCONTINUED | OUTPATIENT
Start: 2025-06-05 | End: 2025-06-08

## 2025-06-05 RX ORDER — FUROSEMIDE 10 MG/ML
40 INJECTION INTRAMUSCULAR; INTRAVENOUS 2 TIMES DAILY
Status: DISCONTINUED | OUTPATIENT
Start: 2025-06-05 | End: 2025-06-07

## 2025-06-05 RX ORDER — POTASSIUM CHLORIDE 7.45 MG/ML
10 INJECTION INTRAVENOUS PRN
Status: DISCONTINUED | OUTPATIENT
Start: 2025-06-05 | End: 2025-06-08 | Stop reason: HOSPADM

## 2025-06-05 RX ORDER — HYDRALAZINE HYDROCHLORIDE 20 MG/ML
10 INJECTION INTRAMUSCULAR; INTRAVENOUS ONCE
Status: COMPLETED | OUTPATIENT
Start: 2025-06-05 | End: 2025-06-05

## 2025-06-05 RX ORDER — SODIUM CHLORIDE 0.9 % (FLUSH) 0.9 %
5-40 SYRINGE (ML) INJECTION PRN
Status: DISCONTINUED | OUTPATIENT
Start: 2025-06-05 | End: 2025-06-08 | Stop reason: HOSPADM

## 2025-06-05 RX ORDER — ACETAMINOPHEN 650 MG/1
650 SUPPOSITORY RECTAL EVERY 6 HOURS PRN
Status: DISCONTINUED | OUTPATIENT
Start: 2025-06-05 | End: 2025-06-08 | Stop reason: HOSPADM

## 2025-06-05 RX ORDER — POLYETHYLENE GLYCOL 3350 17 G/17G
17 POWDER, FOR SOLUTION ORAL DAILY PRN
Status: DISCONTINUED | OUTPATIENT
Start: 2025-06-05 | End: 2025-06-08 | Stop reason: HOSPADM

## 2025-06-05 RX ORDER — ACETAMINOPHEN 325 MG/1
650 TABLET ORAL EVERY 6 HOURS PRN
Status: DISCONTINUED | OUTPATIENT
Start: 2025-06-05 | End: 2025-06-08 | Stop reason: HOSPADM

## 2025-06-05 RX ORDER — MAGNESIUM SULFATE IN WATER 40 MG/ML
2000 INJECTION, SOLUTION INTRAVENOUS PRN
Status: DISCONTINUED | OUTPATIENT
Start: 2025-06-05 | End: 2025-06-08 | Stop reason: HOSPADM

## 2025-06-05 RX ORDER — LABETALOL HYDROCHLORIDE 5 MG/ML
10 INJECTION, SOLUTION INTRAVENOUS ONCE
Status: COMPLETED | OUTPATIENT
Start: 2025-06-05 | End: 2025-06-05

## 2025-06-05 RX ORDER — ONDANSETRON 2 MG/ML
4 INJECTION INTRAMUSCULAR; INTRAVENOUS EVERY 6 HOURS PRN
Status: DISCONTINUED | OUTPATIENT
Start: 2025-06-05 | End: 2025-06-08 | Stop reason: HOSPADM

## 2025-06-05 RX ORDER — AMLODIPINE BESYLATE 5 MG/1
5 TABLET ORAL DAILY
Status: DISCONTINUED | OUTPATIENT
Start: 2025-06-05 | End: 2025-06-05

## 2025-06-05 RX ORDER — SODIUM CHLORIDE 0.9 % (FLUSH) 0.9 %
5-40 SYRINGE (ML) INJECTION EVERY 12 HOURS SCHEDULED
Status: DISCONTINUED | OUTPATIENT
Start: 2025-06-05 | End: 2025-06-08 | Stop reason: HOSPADM

## 2025-06-05 RX ORDER — ENOXAPARIN SODIUM 100 MG/ML
30 INJECTION SUBCUTANEOUS 2 TIMES DAILY
Status: DISCONTINUED | OUTPATIENT
Start: 2025-06-05 | End: 2025-06-08 | Stop reason: HOSPADM

## 2025-06-05 RX ORDER — ONDANSETRON 4 MG/1
4 TABLET, ORALLY DISINTEGRATING ORAL EVERY 8 HOURS PRN
Status: DISCONTINUED | OUTPATIENT
Start: 2025-06-05 | End: 2025-06-08 | Stop reason: HOSPADM

## 2025-06-05 RX ORDER — FAMOTIDINE 20 MG/1
20 TABLET, FILM COATED ORAL 2 TIMES DAILY
Status: DISCONTINUED | OUTPATIENT
Start: 2025-06-05 | End: 2025-06-08 | Stop reason: HOSPADM

## 2025-06-05 RX ORDER — ALBUTEROL SULFATE 5 MG/ML
2.5 SOLUTION RESPIRATORY (INHALATION)
Status: DISCONTINUED | OUTPATIENT
Start: 2025-06-05 | End: 2025-06-08 | Stop reason: HOSPADM

## 2025-06-05 RX ORDER — SODIUM CHLORIDE 9 MG/ML
INJECTION, SOLUTION INTRAVENOUS PRN
Status: DISCONTINUED | OUTPATIENT
Start: 2025-06-05 | End: 2025-06-08 | Stop reason: HOSPADM

## 2025-06-05 RX ORDER — LABETALOL HYDROCHLORIDE 5 MG/ML
20 INJECTION, SOLUTION INTRAVENOUS EVERY 6 HOURS PRN
Status: DISCONTINUED | OUTPATIENT
Start: 2025-06-05 | End: 2025-06-08 | Stop reason: HOSPADM

## 2025-06-05 RX ORDER — HYDRALAZINE HYDROCHLORIDE 20 MG/ML
10 INJECTION INTRAMUSCULAR; INTRAVENOUS EVERY 4 HOURS PRN
Status: DISCONTINUED | OUTPATIENT
Start: 2025-06-05 | End: 2025-06-08 | Stop reason: HOSPADM

## 2025-06-05 RX ADMIN — HYDRALAZINE HYDROCHLORIDE 10 MG: 20 INJECTION INTRAMUSCULAR; INTRAVENOUS at 14:25

## 2025-06-05 RX ADMIN — NICARDIPINE HYDROCHLORIDE 12.5 MG/HR: 25 INJECTION INTRAVENOUS at 18:44

## 2025-06-05 RX ADMIN — NICARDIPINE HYDROCHLORIDE 12.5 MG/HR: 25 INJECTION INTRAVENOUS at 22:29

## 2025-06-05 RX ADMIN — ENOXAPARIN SODIUM 30 MG: 100 INJECTION SUBCUTANEOUS at 20:36

## 2025-06-05 RX ADMIN — NICARDIPINE HYDROCHLORIDE 5 MG/HR: 25 INJECTION INTRAVENOUS at 15:37

## 2025-06-05 RX ADMIN — LABETALOL HYDROCHLORIDE 100 MG: 100 TABLET, FILM COATED ORAL at 16:42

## 2025-06-05 RX ADMIN — NICARDIPINE HYDROCHLORIDE 15 MG/HR: 25 INJECTION INTRAVENOUS at 20:28

## 2025-06-05 RX ADMIN — SODIUM CHLORIDE, PRESERVATIVE FREE 10 ML: 5 INJECTION INTRAVENOUS at 20:36

## 2025-06-05 RX ADMIN — AMLODIPINE BESYLATE 5 MG: 5 TABLET ORAL at 16:42

## 2025-06-05 RX ADMIN — LABETALOL HYDROCHLORIDE 20 MG: 5 INJECTION, SOLUTION INTRAVENOUS at 20:31

## 2025-06-05 RX ADMIN — FUROSEMIDE 40 MG: 10 INJECTION, SOLUTION INTRAMUSCULAR; INTRAVENOUS at 19:31

## 2025-06-05 RX ADMIN — HYDRALAZINE HYDROCHLORIDE 50 MG: 50 TABLET ORAL at 20:36

## 2025-06-05 RX ADMIN — FAMOTIDINE 20 MG: 20 TABLET, FILM COATED ORAL at 20:36

## 2025-06-05 RX ADMIN — LABETALOL HYDROCHLORIDE 10 MG: 5 INJECTION, SOLUTION INTRAVENOUS at 12:52

## 2025-06-05 ASSESSMENT — PAIN SCALES - GENERAL
PAINLEVEL_OUTOF10: 0

## 2025-06-05 ASSESSMENT — LIFESTYLE VARIABLES
HOW MANY STANDARD DRINKS CONTAINING ALCOHOL DO YOU HAVE ON A TYPICAL DAY: PATIENT DOES NOT DRINK
HOW OFTEN DO YOU HAVE A DRINK CONTAINING ALCOHOL: NEVER

## 2025-06-05 NOTE — CARE COORDINATION
06/05/25 1739   Service Assessment   Patient Orientation Alert and Oriented   Cognition Alert   History Provided By Patient   Primary Caregiver Self   Accompanied By/Relationship Pt alone.   Support Systems Family Members;Children   Patient's Healthcare Decision Maker is: Legal Next of Kin   PCP Verified by CM Yes  (No PCP.)   Last Visit to PCP   (No PCP.)   Prior Functional Level Independent in ADLs/IADLs   Current Functional Level Independent in ADLs/IADLs   Can patient return to prior living arrangement Yes   Ability to make needs known: Good   Family able to assist with home care needs: Yes   Would you like for me to discuss the discharge plan with any other family members/significant others, and if so, who? Yes  (Friend Katja Cruz)   Financial Resources Other (Comment)  (Diya DIAS)   Community Resources None   CM/SW Referral Other (see comment)  (None)   Social/Functional History   Lives With Family   Type of Home House   Home Layout One level   Home Access Stairs to enter with rails   Entrance Stairs - Number of Steps 4   Bathroom Shower/Tub Tub/Shower unit   Bathroom Toilet Standard   Bathroom Equipment None   Bathroom Accessibility Accessible   Home Equipment None   Receives Help From Family   Prior Level of Assist for ADLs Independent   Prior Level of Assist for Homemaking Independent   Homemaking Responsibilities Yes   Ambulation Assistance Independent   Prior Level of Assist for Transfers Independent   Active  Yes   Occupation Full time employment   Discharge Planning   Type of Residence House   Living Arrangements Family Members   Current Services Prior To Admission None   Potential Assistance Needed N/A   DME Ordered? No   Potential Assistance Purchasing Medications No   Type of Home Care Services None   Patient expects to be discharged to: House   One/Two Story Residence One story   History of falls? 0   Services At/After Discharge   Transition of Care Consult (CM Consult) Discharge

## 2025-06-05 NOTE — ED PROVIDER NOTES
[facility] [place]  EMERGENCY DEPARTMENT HISTORY AND PHYSICAL EXAM      Date of evaluation: 6/5/2025  Patient Name: Gaston Beltran  Birthdate 1970  MRN: [id number]  ED Provider: Elizabeth George MD   Note Started: 3:44 PM EDT 6/5/25    HISTORY OF PRESENT ILLNESS     Chief Complaint   Patient presents with    Hypertension       History Provided By: Patient     HPI: Gaston Beltran is a 55 y.o. male with past medical history of hypertension, who presents with concern for uncontrolled hypertension, kidney injury diagnosed yesterday.  Patient reports that he was seen at [facility] after a syncopal episode, was found to be extremely hypertensive, was told that he had an ELMA, as well as an elevated troponin based on his discharge paperwork.  They recommended admission and were planning to transfer him here however patient refused.  He he was told that he could come here to be reevaluated at any time, and this is why patient came today.  Patient states that he used to be on amlodipine for his blood pressure, however has not had any medications in several years.  PAST MEDICAL HISTORY   Past Medical History:  No past medical history on file.    Past Surgical History:  No past surgical history on file.    Family History:  No family history on file.    Social History:  Social History     Tobacco Use    Smoking status: Every Day     Current packs/day: 0.25     Types: Cigarettes    Smokeless tobacco: Never   Substance Use Topics    Alcohol use: Yes    Drug use: Never       Allergies:  Not on File    PCP: No primary care provider on file.    Current Meds:   Current Facility-Administered Medications   Medication Dose Route Frequency Provider Last Rate Last Admin    niCARdipine (CARDENE) 25 mg in sodium chloride 0.9 % 250 mL infusion ([id number])  2.5-15 mg/hr IntraVENous Continuous Elizabeth George MD 50 mL/hr at 06/05/25 1537 5 mg/hr at 06/05/25 1537     Current Outpatient Medications   Medication Sig Dispense Refill

## 2025-06-05 NOTE — H&P
CRITICAL CARE ADMISSION NOTE    Name: Gaston Beltran   : 1970   MRN: [id number]   Date: 2025      Diagnoses/problem list:   Hypertensive crisis  Acute kidney injury    HPI   Mr. Beltran is a 55 year old male who presented to the ED with concern for high blood pressure. He had a similar episode 3 months ago and reported that he was told he had high blood pressure at that time. He also presented to [facility] ED last night for his high blood pressure which he reports caused a syncopal episode but left AMA. He reports he does not take any home medications but previously took amlodipine. While in the ED he was noted to be hypertensive and started on a nicardipine infusion prior to any PO medications. He does have an elevated creatinine but no known baseline to compare.     ROS negative except as otherwise documented.    Assessment and plan:     NEUROLOGICAL:    Analgesia / sedation  - denies any pain currently    PULMONOLOGY:   No acute pulmonary concerns  - maintain SpO2>=92, pH>=7.30  - follow ABG and CXR    CARDIOVASCULAR:   Hypertensive crisis  -nicardipine infusion to wean as tolerated. Goal SBP<160 mmHg  -Goal MAP>=65  -Trend troponin / lactate  -Check TTE  -EKG PRN  -started labetalol 100mg PO BID, amlodipine 10mg daily    GASTROINTESTINAL:   Nutrition: regular  Bowel regimen  -Colace 100mg po daily  -bisacodyl PRN  GI Px  -Pepcid 20mg      RENAL/ELECTROLYTE:   ELMA-non-oliguric  - goal K>=4, Mg>=2, Phos >3  - daily BMP  - strict I/O's; daily weights  - avoid nephrotoxic medications  -no baseline creatinine to compare if CKD or ELMA. May be chronically elevated due to untreated severe hypertension    ENDOCRINE:   Maintain euglycemia  -glucose POCT    HEMATOLOGY/ONCOLOGY:   No acute concerns  VTE Px  -Goal Hb>=7  -Trend CBC,PTT/INR  -Enoxaparin, AC/AP   -SCDs    ID/MICRO:   No acute infectious concerns  -Trend fevers, WBC  -Blood cx x2, Urine Cx, Sputum Cx done      ICU DAILY CHECKLIST     Code

## 2025-06-05 NOTE — ED NOTES
Performed:    O2 Flow Rate:    O2 Device: O2 Device: None (Room air)  Cardiac Rhythm: Cardiac Rhythm: Normal sinus rhythm  Critical Lab Results: [unfilled]  Cultures: Cultures:None  NIH Score: NIH     Active LDA's:   Peripheral IV 06/05/25 Left;Dorsal Forearm (Active)     Active Central Lines:                          Active Wounds:    Active Reyes's:    Active Feeding Tubes:      Administered Medications:   Medications   niCARdipine (CARDENE) 25 mg in sodium chloride 0.9 % 250 mL infusion ([id number]) (10 mg/hr IntraVENous Rate/Dose Change 6/5/25 1730)   sodium chloride flush 0.9 % injection 5-40 mL (has no administration in time range)   sodium chloride flush 0.9 % injection 5-40 mL (has no administration in time range)   0.9 % sodium chloride infusion (has no administration in time range)   potassium chloride 20 mEq/50 mL IVPB (Central Line) (has no administration in time range)     Or   potassium chloride 10 mEq/100 mL IVPB (Peripheral Line) (has no administration in time range)   magnesium sulfate 2000 mg in 50 mL IVPB premix (has no administration in time range)   enoxaparin Sodium (LOVENOX) injection 30 mg (has no administration in time range)   ondansetron (ZOFRAN-ODT) disintegrating tablet 4 mg (has no administration in time range)     Or   ondansetron (ZOFRAN) injection 4 mg (has no administration in time range)   polyethylene glycol (GLYCOLAX) packet 17 g (has no administration in time range)   acetaminophen (TYLENOL) tablet 650 mg (has no administration in time range)     Or   acetaminophen (TYLENOL) suppository 650 mg (has no administration in time range)   sodium phosphate 15 mmol in sodium chloride 0.9 % 250 mL IVPB (has no administration in time range)   famotidine (PEPCID) tablet 20 mg (has no administration in time range)   albuterol (PROVENTIL) nebulizer solution 2.5 mg (has no administration in time range)   labetalol (NORMODYNE) tablet 100 mg (100 mg Oral Given 6/5/25 1642)   amLODIPine (NORVASC)

## 2025-06-05 NOTE — ED TRIAGE NOTES
Patient came in after being seen [facility], patient was diagnosised with CKD, HTN and elevated trop last night. No CP or sob during triage. Patient states he refused transfer last night but the reason for going in was a syncope event.     HX of HTN without compliance in medicaitons   Patient

## 2025-06-06 LAB
ANION GAP SERPL CALC-SCNC: 5 MMOL/L (ref 2–12)
BUN SERPL-MCNC: 13 MG/DL (ref 6–20)
BUN/CREAT SERPL: 8 (ref 12–20)
CA-I BLD-MCNC: 8.3 MG/DL (ref 8.5–10.1)
CHLORIDE SERPL-SCNC: 113 MMOL/L (ref 97–108)
CO2 SERPL-SCNC: 26 MMOL/L (ref 21–32)
CREAT SERPL-MCNC: 1.66 MG/DL (ref 0.7–1.3)
ERYTHROCYTE [DISTWIDTH] IN BLOOD BY AUTOMATED COUNT: 14.2 % (ref 11.5–14.5)
GLUCOSE BLD STRIP.AUTO-MCNC: 116 MG/DL (ref 65–100)
GLUCOSE BLD STRIP.AUTO-MCNC: 137 MG/DL (ref 65–100)
GLUCOSE SERPL-MCNC: 123 MG/DL (ref 65–100)
HCT VFR BLD AUTO: 41.5 % (ref 36.6–50.3)
HGB BLD-MCNC: 13.3 G/DL (ref 12.1–17)
MAGNESIUM SERPL-MCNC: 2.1 MG/DL (ref 1.6–2.4)
MCH RBC QN AUTO: 27.4 PG (ref 26–34)
MCHC RBC AUTO-ENTMCNC: 32 G/DL (ref 30–36.5)
MCV RBC AUTO: 85.4 FL (ref 80–99)
NRBC # BLD: 0 K/UL (ref 0–0.01)
NRBC BLD-RTO: 0 PER 100 WBC
PERFORMED BY:: ABNORMAL
PERFORMED BY:: ABNORMAL
PHOSPHATE SERPL-MCNC: 3.8 MG/DL (ref 2.6–4.7)
PLATELET # BLD AUTO: 289 K/UL (ref 150–400)
PMV BLD AUTO: 11 FL (ref 8.9–12.9)
POTASSIUM SERPL-SCNC: 3.5 MMOL/L (ref 3.5–5.1)
RBC # BLD AUTO: 4.86 M/UL (ref 4.1–5.7)
SODIUM SERPL-SCNC: 144 MMOL/L (ref 136–145)
WBC # BLD AUTO: 4.1 K/UL (ref 4.1–11.1)

## 2025-06-06 PROCEDURE — 2000000000 HC ICU R&B

## 2025-06-06 PROCEDURE — 6360000002 HC RX W HCPCS: Performed by: NURSE PRACTITIONER

## 2025-06-06 PROCEDURE — 80048 BASIC METABOLIC PNL TOTAL CA: CPT

## 2025-06-06 PROCEDURE — 6360000002 HC RX W HCPCS

## 2025-06-06 PROCEDURE — 85027 COMPLETE CBC AUTOMATED: CPT

## 2025-06-06 PROCEDURE — 6370000000 HC RX 637 (ALT 250 FOR IP): Performed by: STUDENT IN AN ORGANIZED HEALTH CARE EDUCATION/TRAINING PROGRAM

## 2025-06-06 PROCEDURE — 2500000003 HC RX 250 WO HCPCS: Performed by: STUDENT IN AN ORGANIZED HEALTH CARE EDUCATION/TRAINING PROGRAM

## 2025-06-06 PROCEDURE — 6370000000 HC RX 637 (ALT 250 FOR IP): Performed by: NURSE PRACTITIONER

## 2025-06-06 PROCEDURE — 82962 GLUCOSE BLOOD TEST: CPT

## 2025-06-06 PROCEDURE — 94761 N-INVAS EAR/PLS OXIMETRY MLT: CPT

## 2025-06-06 PROCEDURE — 36415 COLL VENOUS BLD VENIPUNCTURE: CPT

## 2025-06-06 PROCEDURE — 6360000002 HC RX W HCPCS: Performed by: STUDENT IN AN ORGANIZED HEALTH CARE EDUCATION/TRAINING PROGRAM

## 2025-06-06 PROCEDURE — 84100 ASSAY OF PHOSPHORUS: CPT

## 2025-06-06 PROCEDURE — 2580000003 HC RX 258

## 2025-06-06 PROCEDURE — 83735 ASSAY OF MAGNESIUM: CPT

## 2025-06-06 RX ORDER — CLONIDINE HYDROCHLORIDE 0.1 MG/1
0.2 TABLET ORAL 3 TIMES DAILY
Status: DISCONTINUED | OUTPATIENT
Start: 2025-06-06 | End: 2025-06-08

## 2025-06-06 RX ORDER — POTASSIUM CHLORIDE 1500 MG/1
40 TABLET, EXTENDED RELEASE ORAL ONCE
Status: COMPLETED | OUTPATIENT
Start: 2025-06-06 | End: 2025-06-06

## 2025-06-06 RX ORDER — HYDRALAZINE HYDROCHLORIDE 50 MG/1
100 TABLET, FILM COATED ORAL EVERY 8 HOURS SCHEDULED
Status: DISCONTINUED | OUTPATIENT
Start: 2025-06-06 | End: 2025-06-08

## 2025-06-06 RX ADMIN — LABETALOL HYDROCHLORIDE 100 MG: 100 TABLET, FILM COATED ORAL at 21:16

## 2025-06-06 RX ADMIN — LABETALOL HYDROCHLORIDE 20 MG: 5 INJECTION, SOLUTION INTRAVENOUS at 14:46

## 2025-06-06 RX ADMIN — NICARDIPINE HYDROCHLORIDE 7.5 MG/HR: 25 INJECTION INTRAVENOUS at 01:18

## 2025-06-06 RX ADMIN — NICARDIPINE HYDROCHLORIDE 12.5 MG/HR: 25 INJECTION INTRAVENOUS at 20:40

## 2025-06-06 RX ADMIN — NICARDIPINE HYDROCHLORIDE 7.5 MG/HR: 25 INJECTION INTRAVENOUS at 23:10

## 2025-06-06 RX ADMIN — HYDRALAZINE HYDROCHLORIDE 10 MG: 20 INJECTION INTRAMUSCULAR; INTRAVENOUS at 11:07

## 2025-06-06 RX ADMIN — LABETALOL HYDROCHLORIDE 100 MG: 100 TABLET, FILM COATED ORAL at 07:36

## 2025-06-06 RX ADMIN — FAMOTIDINE 20 MG: 20 TABLET, FILM COATED ORAL at 21:16

## 2025-06-06 RX ADMIN — HYDRALAZINE HYDROCHLORIDE 50 MG: 50 TABLET ORAL at 06:02

## 2025-06-06 RX ADMIN — FUROSEMIDE 40 MG: 10 INJECTION, SOLUTION INTRAMUSCULAR; INTRAVENOUS at 07:35

## 2025-06-06 RX ADMIN — ENOXAPARIN SODIUM 30 MG: 100 INJECTION SUBCUTANEOUS at 08:45

## 2025-06-06 RX ADMIN — SODIUM CHLORIDE, PRESERVATIVE FREE 10 ML: 5 INJECTION INTRAVENOUS at 21:17

## 2025-06-06 RX ADMIN — NICARDIPINE HYDROCHLORIDE 12.5 MG/HR: 25 INJECTION INTRAVENOUS at 06:47

## 2025-06-06 RX ADMIN — FUROSEMIDE 40 MG: 10 INJECTION, SOLUTION INTRAMUSCULAR; INTRAVENOUS at 17:30

## 2025-06-06 RX ADMIN — SODIUM CHLORIDE, PRESERVATIVE FREE 10 ML: 5 INJECTION INTRAVENOUS at 07:36

## 2025-06-06 RX ADMIN — CLONIDINE HYDROCHLORIDE 0.2 MG: 0.1 TABLET ORAL at 21:17

## 2025-06-06 RX ADMIN — NICARDIPINE HYDROCHLORIDE 10 MG/HR: 25 INJECTION INTRAVENOUS at 04:24

## 2025-06-06 RX ADMIN — HYDRALAZINE HYDROCHLORIDE 100 MG: 50 TABLET ORAL at 21:16

## 2025-06-06 RX ADMIN — AMLODIPINE BESYLATE 10 MG: 5 TABLET ORAL at 07:35

## 2025-06-06 RX ADMIN — NICARDIPINE HYDROCHLORIDE 4 MG/HR: 25 INJECTION INTRAVENOUS at 13:49

## 2025-06-06 RX ADMIN — POTASSIUM CHLORIDE 40 MEQ: 1500 TABLET, EXTENDED RELEASE ORAL at 11:07

## 2025-06-06 RX ADMIN — NICARDIPINE HYDROCHLORIDE 12 MG/HR: 25 INJECTION INTRAVENOUS at 18:22

## 2025-06-06 RX ADMIN — HYDRALAZINE HYDROCHLORIDE 100 MG: 50 TABLET ORAL at 13:44

## 2025-06-06 RX ADMIN — NICARDIPINE HYDROCHLORIDE 10 MG/HR: 25 INJECTION INTRAVENOUS at 23:10

## 2025-06-06 RX ADMIN — NICARDIPINE HYDROCHLORIDE 12.5 MG/HR: 25 INJECTION INTRAVENOUS at 08:45

## 2025-06-06 RX ADMIN — FAMOTIDINE 20 MG: 20 TABLET, FILM COATED ORAL at 07:35

## 2025-06-06 RX ADMIN — ENOXAPARIN SODIUM 30 MG: 100 INJECTION SUBCUTANEOUS at 21:17

## 2025-06-06 RX ADMIN — HYDRALAZINE HYDROCHLORIDE 10 MG: 20 INJECTION INTRAMUSCULAR; INTRAVENOUS at 15:46

## 2025-06-06 RX ADMIN — NICARDIPINE HYDROCHLORIDE 10 MG/HR: 25 INJECTION INTRAVENOUS at 22:02

## 2025-06-06 ASSESSMENT — PAIN SCALES - GENERAL
PAINLEVEL_OUTOF10: 0

## 2025-06-06 NOTE — CARE COORDINATION
Pt lives with children, Pt is IND with ADL.  Pt drove himself to the hospital and will drive himself home.     Per IDR  Pt admitted with Hypertensive crisis.

## 2025-06-07 LAB
ANION GAP SERPL CALC-SCNC: 4 MMOL/L (ref 2–12)
BUN SERPL-MCNC: 14 MG/DL (ref 6–20)
BUN/CREAT SERPL: 8 (ref 12–20)
CA-I BLD-MCNC: 8 MG/DL (ref 8.5–10.1)
CHLORIDE SERPL-SCNC: 114 MMOL/L (ref 97–108)
CHOLEST SERPL-MCNC: 294 MG/DL
CO2 SERPL-SCNC: 25 MMOL/L (ref 21–32)
CREAT SERPL-MCNC: 1.79 MG/DL (ref 0.7–1.3)
ERYTHROCYTE [DISTWIDTH] IN BLOOD BY AUTOMATED COUNT: 14.3 % (ref 11.5–14.5)
EST. AVERAGE GLUCOSE BLD GHB EST-MCNC: 100 MG/DL
GLUCOSE SERPL-MCNC: 137 MG/DL (ref 65–100)
HBA1C MFR BLD: 5.1 % (ref 4–5.6)
HCT VFR BLD AUTO: 38.4 % (ref 36.6–50.3)
HDLC SERPL-MCNC: 76 MG/DL
HDLC SERPL: 3.9 (ref 0–5)
HGB BLD-MCNC: 12.5 G/DL (ref 12.1–17)
LDLC SERPL CALC-MCNC: 179.2 MG/DL (ref 0–100)
LIPID PANEL: ABNORMAL
MAGNESIUM SERPL-MCNC: 1.8 MG/DL (ref 1.6–2.4)
MCH RBC QN AUTO: 28.2 PG (ref 26–34)
MCHC RBC AUTO-ENTMCNC: 32.6 G/DL (ref 30–36.5)
MCV RBC AUTO: 86.5 FL (ref 80–99)
NRBC # BLD: 0 K/UL (ref 0–0.01)
NRBC BLD-RTO: 0 PER 100 WBC
PHOSPHATE SERPL-MCNC: 3.6 MG/DL (ref 2.6–4.7)
PLATELET # BLD AUTO: 269 K/UL (ref 150–400)
PMV BLD AUTO: 10.7 FL (ref 8.9–12.9)
POTASSIUM SERPL-SCNC: 3.9 MMOL/L (ref 3.5–5.1)
RBC # BLD AUTO: 4.44 M/UL (ref 4.1–5.7)
SODIUM SERPL-SCNC: 143 MMOL/L (ref 136–145)
TRIGL SERPL-MCNC: 194 MG/DL
VLDLC SERPL CALC-MCNC: 38.8 MG/DL
WBC # BLD AUTO: 4.6 K/UL (ref 4.1–11.1)

## 2025-06-07 PROCEDURE — 83036 HEMOGLOBIN GLYCOSYLATED A1C: CPT

## 2025-06-07 PROCEDURE — 6360000002 HC RX W HCPCS

## 2025-06-07 PROCEDURE — 94761 N-INVAS EAR/PLS OXIMETRY MLT: CPT

## 2025-06-07 PROCEDURE — 1100000000 HC RM PRIVATE

## 2025-06-07 PROCEDURE — 2500000003 HC RX 250 WO HCPCS: Performed by: STUDENT IN AN ORGANIZED HEALTH CARE EDUCATION/TRAINING PROGRAM

## 2025-06-07 PROCEDURE — 84100 ASSAY OF PHOSPHORUS: CPT

## 2025-06-07 PROCEDURE — 6370000000 HC RX 637 (ALT 250 FOR IP): Performed by: STUDENT IN AN ORGANIZED HEALTH CARE EDUCATION/TRAINING PROGRAM

## 2025-06-07 PROCEDURE — 2580000003 HC RX 258

## 2025-06-07 PROCEDURE — 80061 LIPID PANEL: CPT

## 2025-06-07 PROCEDURE — 6360000002 HC RX W HCPCS: Performed by: STUDENT IN AN ORGANIZED HEALTH CARE EDUCATION/TRAINING PROGRAM

## 2025-06-07 PROCEDURE — 83735 ASSAY OF MAGNESIUM: CPT

## 2025-06-07 PROCEDURE — 80048 BASIC METABOLIC PNL TOTAL CA: CPT

## 2025-06-07 PROCEDURE — 2000000000 HC ICU R&B

## 2025-06-07 PROCEDURE — 85027 COMPLETE CBC AUTOMATED: CPT

## 2025-06-07 RX ORDER — ATORVASTATIN CALCIUM 20 MG/1
20 TABLET, FILM COATED ORAL NIGHTLY
Status: DISCONTINUED | OUTPATIENT
Start: 2025-06-07 | End: 2025-06-08 | Stop reason: HOSPADM

## 2025-06-07 RX ADMIN — LABETALOL HYDROCHLORIDE 100 MG: 100 TABLET, FILM COATED ORAL at 09:28

## 2025-06-07 RX ADMIN — NICARDIPINE HYDROCHLORIDE 5 MG/HR: 25 INJECTION INTRAVENOUS at 03:42

## 2025-06-07 RX ADMIN — CLONIDINE HYDROCHLORIDE 0.2 MG: 0.1 TABLET ORAL at 09:28

## 2025-06-07 RX ADMIN — ENOXAPARIN SODIUM 30 MG: 100 INJECTION SUBCUTANEOUS at 09:29

## 2025-06-07 RX ADMIN — NICARDIPINE HYDROCHLORIDE 2.5 MG/HR: 25 INJECTION INTRAVENOUS at 03:43

## 2025-06-07 RX ADMIN — CLONIDINE HYDROCHLORIDE 0.2 MG: 0.1 TABLET ORAL at 13:57

## 2025-06-07 RX ADMIN — HYDRALAZINE HYDROCHLORIDE 100 MG: 50 TABLET ORAL at 20:45

## 2025-06-07 RX ADMIN — AMLODIPINE BESYLATE 10 MG: 5 TABLET ORAL at 09:28

## 2025-06-07 RX ADMIN — NICARDIPINE HYDROCHLORIDE 5 MG/HR: 25 INJECTION INTRAVENOUS at 02:04

## 2025-06-07 RX ADMIN — HYDRALAZINE HYDROCHLORIDE 100 MG: 50 TABLET ORAL at 06:04

## 2025-06-07 RX ADMIN — LABETALOL HYDROCHLORIDE 100 MG: 100 TABLET, FILM COATED ORAL at 20:45

## 2025-06-07 RX ADMIN — ENOXAPARIN SODIUM 30 MG: 100 INJECTION SUBCUTANEOUS at 20:45

## 2025-06-07 RX ADMIN — HYDRALAZINE HYDROCHLORIDE 100 MG: 50 TABLET ORAL at 13:57

## 2025-06-07 RX ADMIN — SODIUM CHLORIDE, PRESERVATIVE FREE 5 ML: 5 INJECTION INTRAVENOUS at 10:07

## 2025-06-07 RX ADMIN — FAMOTIDINE 20 MG: 20 TABLET, FILM COATED ORAL at 20:45

## 2025-06-07 RX ADMIN — SODIUM CHLORIDE, PRESERVATIVE FREE 10 ML: 5 INJECTION INTRAVENOUS at 20:45

## 2025-06-07 RX ADMIN — CLONIDINE HYDROCHLORIDE 0.2 MG: 0.1 TABLET ORAL at 20:45

## 2025-06-07 RX ADMIN — FAMOTIDINE 20 MG: 20 TABLET, FILM COATED ORAL at 09:28

## 2025-06-07 ASSESSMENT — PAIN SCALES - GENERAL
PAINLEVEL_OUTOF10: 0

## 2025-06-08 VITALS
WEIGHT: 291.01 LBS | DIASTOLIC BLOOD PRESSURE: 85 MMHG | RESPIRATION RATE: 20 BRPM | SYSTOLIC BLOOD PRESSURE: 148 MMHG | HEART RATE: 64 BPM | HEIGHT: 73 IN | BODY MASS INDEX: 38.57 KG/M2 | TEMPERATURE: 98.4 F | OXYGEN SATURATION: 94 %

## 2025-06-08 PROBLEM — E87.8 HYPERCHLOREMIA: Status: ACTIVE | Noted: 2025-06-08

## 2025-06-08 PROBLEM — E78.2 MIXED HYPERLIPIDEMIA: Status: ACTIVE | Noted: 2025-06-08

## 2025-06-08 PROBLEM — I16.9 HYPERTENSIVE CRISIS: Status: RESOLVED | Noted: 2025-06-05 | Resolved: 2025-06-08

## 2025-06-08 PROBLEM — I21.4 NSTEMI, INITIAL EPISODE OF CARE (HCC): Status: RESOLVED | Noted: 2025-06-08 | Resolved: 2025-06-08

## 2025-06-08 PROBLEM — I21.4 NSTEMI, INITIAL EPISODE OF CARE (HCC): Status: ACTIVE | Noted: 2025-06-08

## 2025-06-08 PROBLEM — N17.9 AKI (ACUTE KIDNEY INJURY): Status: ACTIVE | Noted: 2025-06-08

## 2025-06-08 LAB
ANION GAP SERPL CALC-SCNC: 2 MMOL/L (ref 2–12)
BUN SERPL-MCNC: 12 MG/DL (ref 6–20)
BUN/CREAT SERPL: 7 (ref 12–20)
CA-I BLD-MCNC: 8.2 MG/DL (ref 8.5–10.1)
CHLORIDE SERPL-SCNC: 115 MMOL/L (ref 97–108)
CO2 SERPL-SCNC: 27 MMOL/L (ref 21–32)
CREAT SERPL-MCNC: 1.71 MG/DL (ref 0.7–1.3)
ERYTHROCYTE [DISTWIDTH] IN BLOOD BY AUTOMATED COUNT: 14.6 % (ref 11.5–14.5)
GLUCOSE SERPL-MCNC: 111 MG/DL (ref 65–100)
HCT VFR BLD AUTO: 36.7 % (ref 36.6–50.3)
HGB BLD-MCNC: 11.9 G/DL (ref 12.1–17)
MCH RBC QN AUTO: 28.7 PG (ref 26–34)
MCHC RBC AUTO-ENTMCNC: 32.4 G/DL (ref 30–36.5)
MCV RBC AUTO: 88.4 FL (ref 80–99)
NRBC # BLD: 0 K/UL (ref 0–0.01)
NRBC BLD-RTO: 0 PER 100 WBC
PLATELET # BLD AUTO: 275 K/UL (ref 150–400)
PMV BLD AUTO: 11 FL (ref 8.9–12.9)
POTASSIUM SERPL-SCNC: 4.1 MMOL/L (ref 3.5–5.1)
RBC # BLD AUTO: 4.15 M/UL (ref 4.1–5.7)
SODIUM SERPL-SCNC: 144 MMOL/L (ref 136–145)
WBC # BLD AUTO: 3.7 K/UL (ref 4.1–11.1)

## 2025-06-08 PROCEDURE — 80048 BASIC METABOLIC PNL TOTAL CA: CPT

## 2025-06-08 PROCEDURE — 36415 COLL VENOUS BLD VENIPUNCTURE: CPT

## 2025-06-08 PROCEDURE — 94761 N-INVAS EAR/PLS OXIMETRY MLT: CPT

## 2025-06-08 PROCEDURE — 85027 COMPLETE CBC AUTOMATED: CPT

## 2025-06-08 PROCEDURE — 6370000000 HC RX 637 (ALT 250 FOR IP): Performed by: STUDENT IN AN ORGANIZED HEALTH CARE EDUCATION/TRAINING PROGRAM

## 2025-06-08 PROCEDURE — 2500000003 HC RX 250 WO HCPCS: Performed by: STUDENT IN AN ORGANIZED HEALTH CARE EDUCATION/TRAINING PROGRAM

## 2025-06-08 PROCEDURE — 6370000000 HC RX 637 (ALT 250 FOR IP): Performed by: NURSE PRACTITIONER

## 2025-06-08 RX ORDER — AMLODIPINE BESYLATE 10 MG/1
10 TABLET ORAL NIGHTLY
Qty: 30 TABLET | Refills: 3 | Status: SHIPPED | OUTPATIENT
Start: 2025-06-08

## 2025-06-08 RX ORDER — HYDRALAZINE HYDROCHLORIDE 100 MG/1
100 TABLET, FILM COATED ORAL EVERY 8 HOURS SCHEDULED
Qty: 90 TABLET | Refills: 3 | Status: SHIPPED | OUTPATIENT
Start: 2025-06-08 | End: 2025-06-08 | Stop reason: HOSPADM

## 2025-06-08 RX ORDER — CLONIDINE HYDROCHLORIDE 0.2 MG/1
0.2 TABLET ORAL 3 TIMES DAILY
Qty: 60 TABLET | Refills: 3 | Status: SHIPPED | OUTPATIENT
Start: 2025-06-08 | End: 2025-06-08 | Stop reason: HOSPADM

## 2025-06-08 RX ORDER — ASPIRIN 81 MG/1
81 TABLET, CHEWABLE ORAL DAILY
Qty: 30 TABLET | Refills: 3 | Status: CANCELLED | OUTPATIENT
Start: 2025-06-08

## 2025-06-08 RX ORDER — FAMOTIDINE 20 MG/1
20 TABLET, FILM COATED ORAL 2 TIMES DAILY
Qty: 60 TABLET | Refills: 3 | Status: CANCELLED | OUTPATIENT
Start: 2025-06-08

## 2025-06-08 RX ORDER — ASPIRIN 81 MG/1
81 TABLET, CHEWABLE ORAL DAILY
Status: DISCONTINUED | OUTPATIENT
Start: 2025-06-08 | End: 2025-06-08 | Stop reason: HOSPADM

## 2025-06-08 RX ORDER — ATORVASTATIN CALCIUM 20 MG/1
20 TABLET, FILM COATED ORAL NIGHTLY
Qty: 30 TABLET | Refills: 3 | Status: SHIPPED | OUTPATIENT
Start: 2025-06-08

## 2025-06-08 RX ORDER — CHLORTHALIDONE 25 MG/1
25 TABLET ORAL DAILY
Status: DISCONTINUED | OUTPATIENT
Start: 2025-06-08 | End: 2025-06-08 | Stop reason: HOSPADM

## 2025-06-08 RX ORDER — LABETALOL 100 MG/1
100 TABLET, FILM COATED ORAL EVERY 12 HOURS SCHEDULED
Qty: 60 TABLET | Refills: 3 | Status: SHIPPED | OUTPATIENT
Start: 2025-06-08 | End: 2025-06-08 | Stop reason: HOSPADM

## 2025-06-08 RX ORDER — VALSARTAN 160 MG/1
160 TABLET ORAL DAILY
Status: DISCONTINUED | OUTPATIENT
Start: 2025-06-08 | End: 2025-06-08 | Stop reason: HOSPADM

## 2025-06-08 RX ORDER — VALSARTAN 160 MG/1
160 TABLET ORAL DAILY
Qty: 30 TABLET | Refills: 3 | Status: SHIPPED | OUTPATIENT
Start: 2025-06-08

## 2025-06-08 RX ORDER — AMLODIPINE BESYLATE 10 MG/1
10 TABLET ORAL DAILY
Qty: 30 TABLET | Refills: 3 | Status: SHIPPED | OUTPATIENT
Start: 2025-06-09 | End: 2025-06-08

## 2025-06-08 RX ORDER — ASPIRIN 81 MG/1
81 TABLET, CHEWABLE ORAL DAILY
Qty: 30 TABLET | Refills: 3 | Status: SHIPPED | OUTPATIENT
Start: 2025-06-08

## 2025-06-08 RX ORDER — CHLORTHALIDONE 25 MG/1
25 TABLET ORAL NIGHTLY
Qty: 30 TABLET | Refills: 3 | Status: SHIPPED | OUTPATIENT
Start: 2025-06-08

## 2025-06-08 RX ADMIN — ASPIRIN 81 MG: 81 TABLET, CHEWABLE ORAL at 18:29

## 2025-06-08 RX ADMIN — LABETALOL HYDROCHLORIDE 100 MG: 100 TABLET, FILM COATED ORAL at 09:16

## 2025-06-08 RX ADMIN — FAMOTIDINE 20 MG: 20 TABLET, FILM COATED ORAL at 09:16

## 2025-06-08 RX ADMIN — SODIUM CHLORIDE, PRESERVATIVE FREE 5 ML: 5 INJECTION INTRAVENOUS at 09:40

## 2025-06-08 RX ADMIN — CLONIDINE HYDROCHLORIDE 0.2 MG: 0.1 TABLET ORAL at 15:34

## 2025-06-08 RX ADMIN — HYDRALAZINE HYDROCHLORIDE 100 MG: 50 TABLET ORAL at 09:17

## 2025-06-08 RX ADMIN — VALSARTAN 160 MG: 160 TABLET, FILM COATED ORAL at 18:29

## 2025-06-08 RX ADMIN — HYDRALAZINE HYDROCHLORIDE 100 MG: 50 TABLET ORAL at 15:34

## 2025-06-08 RX ADMIN — CHLORTHALIDONE 25 MG: 25 TABLET ORAL at 18:29

## 2025-06-08 RX ADMIN — CLONIDINE HYDROCHLORIDE 0.2 MG: 0.1 TABLET ORAL at 09:16

## 2025-06-08 RX ADMIN — AMLODIPINE BESYLATE 10 MG: 5 TABLET ORAL at 09:16

## 2025-06-08 ASSESSMENT — PAIN SCALES - GENERAL
PAINLEVEL_OUTOF10: 0

## 2025-06-08 NOTE — DISCHARGE SUMMARY
Discharge Summary    Name: Gaston Beltran  [id number]  YOB: 1970 (Age: 55 y.o.)   Date of Admission: 6/5/2025  Date of Discharge: 6/8/2025  Attending Physician: Stan Yañez MD    Discharge Diagnosis:   Principal Problem (Resolved):    Hypertensive crisis  Active Problems:    ELMA (acute kidney injury)    Mixed hyperlipidemia    Hyperchloremia  Resolved Problems:    NSTEMI, initial episode of care (HCC)       Consultations:  IP CONSULT TO CARDIOLOGY  IP CONSULT TO NEPHROLOGY      Brief Hospital Course by Main Problems: Gaston Beltran is a 55 y.o.  male with PMHx significant for hypertension, medication noncompliance, who presented to the [facility] ED 2 days prior to admission after a syncopal episode at home, was found to be hypertensive crisis and ELMA.  ED staff at [facility] recommended that patient be transferred to [place], however patient reports that he refused because he had to work the next day, and left AMA.  Patient decided to come back for medical care after being encouraged by his family, patient presented to [place] ED on 6/5 for further evaluation.  Found to be in hypertensive crisis, ELMA, and elevated troponin/NSTEMI.  Patient was started on nicardipine drip and admitted to the ICU for further management.  Patient was weaned off of Cardene drip, troponins peaked at 249, ELMA showed improvement with downtrending creatinine and patient was transferred to hospital medicine service on 6/7.  TTE with 65 to 70% EF, normal LV systolic function, LV dilated, severely increased wall thickness, normal diastolic function, normal RV function.  Cardiology was consulted, no plans for inpatient ischemia eval, will refer to [facility] after discharge for continued care and possible outpatient ischemic eval.  Patient also set to establish care with PCP after discharge.  Nephrology was consulted as well due to persistently elevated creatinine, ELMA versus

## 2025-06-08 NOTE — PLAN OF CARE
Problem: Discharge Planning  Goal: Discharge to home or other facility with appropriate resources  Outcome: Progressing     Problem: Pain  Goal: Verbalizes/displays adequate comfort level or baseline comfort level  Outcome: Progressing  Flowsheets (Taken 6/5/2025 1820 by Maureen Nieto RN)  Verbalizes/displays adequate comfort level or baseline comfort level:   Encourage patient to monitor pain and request assistance   Assess pain using appropriate pain scale   Administer analgesics based on type and severity of pain and evaluate response   Implement non-pharmacological measures as appropriate and evaluate response   Consider cultural and social influences on pain and pain management   Notify Licensed Independent Practitioner if interventions unsuccessful or patient reports new pain     Problem: Safety - Adult  Goal: Free from fall injury  Outcome: Progressing     
  Problem: Discharge Planning  Goal: Discharge to home or other facility with appropriate resources  Outcome: Progressing  Flowsheets (Taken 6/7/2025 0800)  Discharge to home or other facility with appropriate resources:   Arrange for needed discharge resources and transportation as appropriate   Identify barriers to discharge with patient and caregiver   Identify discharge learning needs (meds, wound care, etc)   Arrange for interpreters to assist at discharge as needed   Refer to discharge planning if patient needs post-hospital services based on physician order or complex needs related to functional status, cognitive ability or social support system     Problem: Pain  Goal: Verbalizes/displays adequate comfort level or baseline comfort level  Outcome: Progressing  Flowsheets (Taken 6/7/2025 1000)  Verbalizes/displays adequate comfort level or baseline comfort level:   Encourage patient to monitor pain and request assistance   Assess pain using appropriate pain scale   Administer analgesics based on type and severity of pain and evaluate response   Implement non-pharmacological measures as appropriate and evaluate response   Consider cultural and social influences on pain and pain management   Notify Licensed Independent Practitioner if interventions unsuccessful or patient reports new pain     Problem: Safety - Adult  Goal: Free from fall injury  Outcome: Progressing     Problem: Safety - Adult  Goal: Free from fall injury  Outcome: Progressing     
consult as needed     Problem: Chronic Conditions and Co-morbidities  Goal: Patient's chronic conditions and co-morbidity symptoms are monitored and maintained or improved  6/8/2025 0952 by Lonny Maher RN  Outcome: Completed  Flowsheets (Taken 6/8/2025 0800)  Care Plan - Patient's Chronic Conditions and Co-Morbidity Symptoms are Monitored and Maintained or Improved:   Monitor and assess patient's chronic conditions and comorbid symptoms for stability, deterioration, or improvement   Collaborate with multidisciplinary team to address chronic and comorbid conditions and prevent exacerbation or deterioration   Update acute care plan with appropriate goals if chronic or comorbid symptoms are exacerbated and prevent overall improvement and discharge  6/7/2025 2128 by Nereida Wei RN  Outcome: Progressing  Flowsheets (Taken 6/7/2025 0800 by Lonny Maher, RN)  Care Plan - Patient's Chronic Conditions and Co-Morbidity Symptoms are Monitored and Maintained or Improved:   Monitor and assess patient's chronic conditions and comorbid symptoms for stability, deterioration, or improvement   Collaborate with multidisciplinary team to address chronic and comorbid conditions and prevent exacerbation or deterioration   Update acute care plan with appropriate goals if chronic or comorbid symptoms are exacerbated and prevent overall improvement and discharge     Problem: Hematologic - Adult  Goal: Maintains hematologic stability  6/8/2025 0952 by Lonny Maher RN  Outcome: Completed  Flowsheets (Taken 6/8/2025 0800)  Maintains hematologic stability:   Assess for signs and symptoms of bleeding or hemorrhage   Monitor labs for bleeding or clotting disorders   Administer blood products/factors as ordered  6/7/2025 2128 by Nereida Wei RN  Outcome: Progressing  Flowsheets (Taken 6/7/2025 0800 by Lonny Maher RN)  Maintains hematologic stability:   Administer blood products/factors as ordered   Monitor labs for bleeding or 
rhythm     Problem: Metabolic/Fluid and Electrolytes - Adult  Goal: Electrolytes maintained within normal limits  6/6/2025 2057 by Emily Mike RN  Outcome: Progressing  Flowsheets (Taken 6/6/2025 2000)  Electrolytes maintained within normal limits: Monitor labs and assess patient for signs and symptoms of electrolyte imbalances  6/6/2025 0953 by Maureen Nieto RN  Outcome: Progressing  Flowsheets  Taken 6/6/2025 0800 by Maureen Nieto RN  Electrolytes maintained within normal limits:   Monitor labs and assess patient for signs and symptoms of electrolyte imbalances   Administer electrolyte replacement as ordered   Monitor response to electrolyte replacements, including repeat lab results as appropriate   Fluid restriction as ordered   Instruct patient on fluid and nutrition restrictions as appropriate  Taken 6/6/2025 0600 by Emily Mike RN  Electrolytes maintained within normal limits: Monitor labs and assess patient for signs and symptoms of electrolyte imbalances  Goal: Hemodynamic stability and optimal renal function maintained  6/6/2025 2057 by Emily Mike RN  Outcome: Progressing  Flowsheets (Taken 6/6/2025 2000)  Hemodynamic stability and optimal renal function maintained: Monitor labs and assess for signs and symptoms of volume excess or deficit  6/6/2025 0953 by Maureen Nieto RN  Outcome: Progressing  Flowsheets  Taken 6/6/2025 0800 by Maureen Nieto RN  Hemodynamic stability and optimal renal function maintained:   Monitor labs and assess for signs and symptoms of volume excess or deficit   Monitor intake, output and patient weight   Monitor urine specific gravity, serum osmolarity and serum sodium as indicated or ordered   Monitor response to interventions for patient's volume status, including labs, urine output, blood pressure (other measures as available)   Encourage oral intake as appropriate   Instruct patient on fluid and nutrition restrictions as 
nutrition consult as needed   Instruct patient on self management of diabetes and initiate consult as needed  Taken 6/6/2025 0600 by Emily Mike, RN  Glucose maintained within prescribed range: Monitor blood glucose as ordered     Problem: Chronic Conditions and Co-morbidities  Goal: Patient's chronic conditions and co-morbidity symptoms are monitored and maintained or improved  Outcome: Progressing     Problem: Hematologic - Adult  Goal: Maintains hematologic stability  Outcome: Progressing

## 2025-06-08 NOTE — CONSULTS
CARDIOLOGY CONSULTATION    REASON FOR CONSULT: NSTEMI    REQUESTING PROVIDER: Jayce Graf NP    CHIEF COMPLAINT:      HISTORY OF PRESENT ILLNESS:  Gaston Beltran is a 55 y.o. year-old male with past medical history significant for hypertension not on meds who was evaluated today due to NSTEMI. He presented originally to [facility] w/ a syncopal episode and hypertension, he left AMA there then came here. In the ED, /122, initially on nicardipine drip, now moderately elevated on po meds. His troponin from admission in the 200s, flat.  Creatinine also mildly abnormal. On exam, denies chest pain or shortness of breath.     Records from hospital admission course thus far reviewed.      Telemetry reviewed.  No events overnight. Sinus rhythm.    INPATIENT MEDICATIONS:  Home medications reviewed.    Current Facility-Administered Medications:     atorvastatin (LIPITOR) tablet 20 mg, 20 mg, Oral, Nightly, Jayce Graf, SHIRINP    hydrALAZINE (APRESOLINE) tablet 100 mg, 100 mg, Oral, 3 times per day, Tamra Pimentel MD, 100 mg at 06/08/25 0917    cloNIDine (CATAPRES) tablet 0.2 mg, 0.2 mg, Oral, TID, Tamra Pimentel MD, 0.2 mg at 06/08/25 0916    sodium chloride flush 0.9 % injection 5-40 mL, 5-40 mL, IntraVENous, 2 times per day, Tamra Pimentel MD, 5 mL at 06/08/25 0940    sodium chloride flush 0.9 % injection 5-40 mL, 5-40 mL, IntraVENous, PRN, Tamra Pimentel MD    0.9 % sodium chloride infusion, , IntraVENous, PRN, Tamra Pimentel MD    potassium chloride 20 mEq/50 mL IVPB (Central Line), 20 mEq, IntraVENous, PRN **OR** potassium chloride 10 mEq/100 mL IVPB (Peripheral Line), 10 mEq, IntraVENous, PRNLoren Katherine M, MD    magnesium sulfate 2000 mg in 50 mL IVPB premix, 2,000 mg, IntraVENous, PRN, Tamra Pimentel MD    enoxaparin Sodium (LOVENOX) injection 30 mg, 30 mg, SubCUTAneous, BID, Tamra Pimentel MD, 30 mg at 06/07/25 2045    ondansetron (ZOFRAN-ODT) 
  HCT 36.7 38.4 41.5    269 289     Recent Labs     06/06/25  1146 06/06/25  0809   POCGLU 116* 137*     Urine studies: 06/05/25 16:46  Creatinine, Ur: 70.00  SODIUM, RANDOM URINE: 77    Radiology  No results found.       06/05/25    ECHO (TTE) COMPLETE (PRN CONTRAST/BUBBLE/STRAIN/3D) 06/05/2025 11:42 PM (Final)    Interpretation Summary    Left Ventricle: Normal left ventricular systolic function with a visually estimated EF of 65 - 70%. Left ventricle is dilated. Severely increased wall thickness. EF BP is 69%. Normal diastolic function.    Right Ventricle: Right ventricle size is normal. Normal systolic function. TAPSE is normal. TAPSE is 3.3 cm. RV Free Wall Peak S' is 26.7 cm/s.    Image quality is fair.    Signed by: All Bennett MD on 6/5/2025 11:42 PM         Assessment:   Renal Specific Problems  CKD 3A  Volume overload  Hyperlipidemia        Plan:     Obtain/ Order: labs/cultures/radiology/procedures:    Will get renal US as outpatient, will check urine protein/creatinine ratio    Therapeutic:    Change BP meds to as follows:  Amlodipine 10 mg/ hs  Valsartan 160 mg / hs  Chlorthalidone 25 mg q AM    Will start SGLT2 agonist as outpatient    Folllow up in office in 2-3 weeks-     Risk of deterioration: low      Total time spent with patient: 30 Minutes Interview/ exam and note                 Discussed:   patient and family present, Dr. Pimentel           ___________________________________________________    Attending Physician: Noemy Diez MD     
visit (from the past 12 hours).      Imaging Results (most recent):  Echo (TTE) complete (PRN contrast/bubble/strain/3D)  Result Date: 6/5/2025    Left Ventricle: Normal left ventricular systolic function with a visually estimated EF of 65 - 70%. Left ventricle is dilated. Severely increased wall thickness. EF BP is 69%. Normal diastolic function.   Right Ventricle: Right ventricle size is normal. Normal systolic function. TAPSE is normal. TAPSE is 3.3 cm. RV Free Wall Peak S' is 26.7 cm/s.   Image quality is fair.         _______________________________________________________________________    TOTAL TIME:  70 Minutes    Critical Care Provided     Minutes non procedure based    Signed: ELISEO Hall    Procedures: see electronic medical records for all procedures/Xrays and details which were not copied into this note but were reviewed prior to creation of Plan.

## 2025-06-09 NOTE — PROGRESS NOTES
0715: Bedside and Verbal shift change report given to Emily Mike, RN (oncoming nurse) by Jennifer Nieto RN (offgoing nurse). Report included the following information Nurse Handoff Report, Index, ED Encounter Summary, ED SBAR, Adult Overview, Intake/Output, MAR, Recent Results, Med Rec Status, Cardiac Rhythm SR, Alarm Parameters, Quality Measures, and Neuro Assessment.     0800: Assessment completed. Pt repositioned in bed.     1000: Pt repositioned in bed.     1200: Reassessment completed. Pt repositioned in bed.     1400: Pt repositioned in bed.     1600: Reassessment completed. Pt repositioned in bed.     1800: Pt repositioned in bed.     1915: Bedside and Verbal shift change report given to Emily Mike RN (oncoming nurse) by Jennifer Nieto RN (offgoing nurse). Report included the following information Nurse Handoff Report, Index, ED Encounter Summary, ED SBAR, Adult Overview, Intake/Output, MAR, Recent Results, Med Rec Status, Cardiac Rhythm SR, Alarm Parameters, Quality Measures, and Neuro Assessment.         
1756: Verbal transfer report given to Jennifer Nieto RN by Simone Umaña, RN in ED. Report included the following information Nurse Handoff Report, Index, ED Encounter Summary, ED SBAR, Adult Overview, Intake/Output, MAR, Recent Results, Med Rec Status, Cardiac Rhythm SR-ST, Alarm Parameters, Quality Measures, and Neuro Assessment.     1820: Pt transported in stable condition to ICU with all pt belongings. Family members at the bedside. Cardene drip continues at 10.     1911: Bedside and Verbal shift change report given to Emily Mike, CARMEN  (oncoming nurse) by Jennifer Nieto RN (offgoing nurse). Nurse Handoff Report, Index, ED Encounter Summary, ED SBAR, Adult Overview, Intake/Output, MAR, Recent Results, Med Rec Status, Cardiac Rhythm SR-ST, Alarm Parameters, Quality Measures, and Neuro Assessment.        
4 Eyes Skin Assessment     NAME:  Gaston Beltran  YOB: 1970  MEDICAL RECORD NUMBER:  [id number]    The patient is being assessed for  Admission    I agree that at least one RN has performed a thorough Head to Toe Skin Assessment on the patient. ALL assessment sites listed below have been assessed.      Areas assessed by both nurses:    Head, Face, Ears, Shoulders, Back, Chest, Arms, Elbows, Hands, Sacrum. Buttock, Coccyx, Ischium, and Legs. Feet and Heels        Does the Patient have a Wound? No noted wound(s)       Iván Prevention initiated by RN: Yes  Wound Care Orders initiated by RN: No    Pressure Injury (Stage 3,4, Unstageable, DTI, NWPT, and Complex wounds) if present, place Wound referral order by RN under : No    New Ostomies, if present place, Ostomy referral order under : No     Nurse 1 eSignature: Electronically signed by Maureen Nieto RN on 6/5/25 at 6:57 PM EDT    **SHARE this note so that the co-signing nurse can place an eSignature**    Nurse 2 eSignature: Electronically signed by Emily Mike RN on 6/5/25 at 7:11 PM EDT   
6/9/2025        RE: Gaston Beltran         [address] [place] [id number]          To Whom It May Concern,      Due to medical reasons, Gaston Beltran may  may return to work on 06/10/2025.        Sincerely,          Neetu Asher RN   
CRITICAL CARE PROGRESS NOTE    Name: Gaston Beltran   : 1970   MRN: [id number]   Date: 2025      Diagnoses/problem list:   Hypertensive crisis  Acute kidney injury    HPI   Mr. Beltran is a 55 year old male who presented to the ED with concern for high blood pressure. He had a similar episode 3 months ago and reported that he was told he had high blood pressure at that time. He also presented to [facility] ED last night for his high blood pressure which he reports caused a syncopal episode but left AMA. He reports he does not take any home medications but previously took amlodipine. While in the ED he was noted to be hypertensive and started on a nicardipine infusion prior to any PO medications. He does have an elevated creatinine but no known baseline to compare.     : remains on nicardipine infusion with addition of increased PO meds   : nicardipine infusion weaned off and PO medications adjusted for SBP goals    ROS negative except as otherwise documented.    Assessment and plan:     NEUROLOGICAL:    Analgesia / sedation  - denies any pain currently    PULMONOLOGY:   No acute pulmonary concerns  - maintain SpO2>=92, pH>=7.30  - follow ABG and CXR    CARDIOVASCULAR:   Hypertensive crisis  -nicardipine infusion weaned off. Goal SBP<160 mmHg  -Goal MAP>=65  -Trend troponin / lactate  -TTE LVEF 65-70%, severely increased wall thickness  -EKG PRN  -started labetalol 100mg PO BID, amlodipine 10mg daily and hydralazine 100mg q8 hours  -lipid panel and Hba1c ordered for baseline comparison  -clonidine PO added    GASTROINTESTINAL:   Nutrition: regular  Bowel regimen  -Colace 100mg po daily  -bisacodyl PRN  GI Px  -Pepcid 20mg      RENAL/ELECTROLYTE:   ELMA-non-oliguric  - goal K>=4, Mg>=2, Phos >3  - daily BMP  - strict I/O's; daily weights  - avoid nephrotoxic medications  -no baseline creatinine to compare if CKD or ELMA. May be chronically elevated due to untreated severe hypertension  -lasix 40mg IV BID 
Echo completed. Report to follow.    
IVL dc'd. Discharge instructions given and pt voiced understanding of said instructions. Meds given for today with instructions. Pt dc'd via wheelchair with family.  
prevent imminent deterioration.    I personally spent 35 minutes of critical care time.  This is time spent at this critically ill patient's bedside actively involved in patient care as well as the coordination of care.  This does not include any procedural time which has been billed separately.    Tammie Pimentel MD  Critical Care Medicine  [facility]

## 2025-06-10 LAB
BACTERIA SPEC CULT: NORMAL
Lab: NORMAL

## (undated) DEVICE — BASIC SINGLE BASIN-LF: Brand: MEDLINE INDUSTRIES, INC.

## (undated) DEVICE — PREP PAD BNS: Brand: CONVERTORS

## (undated) DEVICE — TOWEL SURG W17XL27IN STD BLU COT NONFENESTRATED PREWASHED

## (undated) DEVICE — ROCKER SWITCH PENCIL BLADE ELECTRODE, HOLSTER: Brand: EDGE

## (undated) DEVICE — PREP SKN CHLRAPRP APL 26ML STR --

## (undated) DEVICE — 3.2MM GUIDE WIRE 400MM

## (undated) DEVICE — PAD ABSORBENT 40X28 IN POLY BACKING BLU DRI-SAFE

## (undated) DEVICE — 3M™ IOBAN™ 2 ANTIMICROBIAL INCISE DRAPE 6650EZ: Brand: IOBAN™ 2

## (undated) DEVICE — GAUZE,SPONGE,4"X4",16PLY,STRL,LF,10/TRAY: Brand: MEDLINE

## (undated) DEVICE — SOUTHSIDE TURNOVER: Brand: MEDLINE INDUSTRIES, INC.

## (undated) DEVICE — 3M™ STERI-STRIP™ REINFORCED ADHESIVE SKIN CLOSURES, R1547, 1/2 IN X 4 IN (12 MM X 100 MM), 6 STRIPS/ENVELOPE: Brand: 3M™ STERI-STRIP™

## (undated) DEVICE — SPONGE GZ W4XL4IN COT 12 PLY TYP VII WVN C FLD DSGN

## (undated) DEVICE — COVER LT HNDL BLU PLAS

## (undated) DEVICE — PAD,NON-ADHERENT,3X8,STERILE,LF,1/PK: Brand: MEDLINE

## (undated) DEVICE — INTENDED FOR TISSUE SEPARATION, AND OTHER PROCEDURES THAT REQUIRE A SHARP SURGICAL BLADE TO PUNCTURE OR CUT.: Brand: BARD-PARKER ® CARBON RIB-BACK BLADES

## (undated) DEVICE — GOWN,PREVENTION PLUS,XLN/XL,ST,24/CS: Brand: MEDLINE

## (undated) DEVICE — DRAPE,REIN 53X77,STERILE: Brand: MEDLINE

## (undated) DEVICE — DRAPE EQUIP C ARM 74X42 IN MOB XR W/ TIE RUBBER BND LF

## (undated) DEVICE — 3M™ STERI-DRAPE™ U-DRAPE 1015: Brand: STERI-DRAPE™

## (undated) DEVICE — SOLUTION IRRIG 1000ML STRL H2O USP PLAS POUR BTL

## (undated) DEVICE — SUT VCRL + 1 36IN CT1 VIO --

## (undated) DEVICE — TUBING, SUCTION, 1/4" X 12', STRAIGHT: Brand: MEDLINE

## (undated) DEVICE — SUTURE MCRYL + SZ 3-0 L27IN ABSRB UD PS1 L24MM 3/8 CIR REV MCP936H

## (undated) DEVICE — GLOVE ORANGE PI 7 1/2   MSG9075

## (undated) DEVICE — SOLUTION IRRIG 1000ML 0.9% SOD CHL USP POUR PLAS BTL

## (undated) DEVICE — DRESSING,GAUZE,PETROLATUM,CURAD,3"X9",ST: Brand: CURAD

## (undated) DEVICE — SPONGE LAP 18X18IN STRL -- 5/PK

## (undated) DEVICE — GARMENT,MEDLINE,DVT,INT,CALF,MED, GEN2: Brand: MEDLINE

## (undated) DEVICE — SUT VCRL + 2-0 36IN CT1 UD --

## (undated) DEVICE — DRAPE,ISOLATION,INCISE,INVISISHIELD: Brand: MEDLINE

## (undated) DEVICE — MAJOR ORTHO PROCEDURE PACK: Brand: MEDLINE INDUSTRIES, INC.

## (undated) DEVICE — REM POLYHESIVE ADULT PATIENT RETURN ELECTRODE: Brand: VALLEYLAB

## (undated) DEVICE — BIT DRL L330MM DIA4.2MM CALIB 100MM 3 FLUT QUIK CPL

## (undated) DEVICE — BANDAGE COBAN 6 IN WND 6INX5YD FOAM